# Patient Record
Sex: FEMALE | Race: WHITE | NOT HISPANIC OR LATINO | URBAN - METROPOLITAN AREA
[De-identification: names, ages, dates, MRNs, and addresses within clinical notes are randomized per-mention and may not be internally consistent; named-entity substitution may affect disease eponyms.]

---

## 2022-10-24 ENCOUNTER — LAB (OUTPATIENT)
Dept: LAB | Facility: HOSPITAL | Age: 37
End: 2022-10-24

## 2022-10-24 ENCOUNTER — TRANSCRIBE ORDERS (OUTPATIENT)
Dept: LAB | Facility: HOSPITAL | Age: 37
End: 2022-10-24

## 2022-10-24 DIAGNOSIS — Z01.419 ROUTINE GYNECOLOGICAL EXAMINATION: Primary | ICD-10-CM

## 2022-10-24 DIAGNOSIS — Z01.419 ROUTINE GYNECOLOGICAL EXAMINATION: ICD-10-CM

## 2022-10-24 LAB
ESTRADIOL SERPL HS-MCNC: 44.5 PG/ML
FSH SERPL-ACNC: 4.69 MIU/ML
LH SERPL-ACNC: 4.99 MIU/ML
PROGEST SERPL-MCNC: 0.31 NG/ML
PROLACTIN SERPL-MCNC: 20.6 NG/ML (ref 4.79–23.3)
TSH SERPL DL<=0.05 MIU/L-ACNC: 3.18 UIU/ML (ref 0.27–4.2)

## 2022-10-24 PROCEDURE — 83001 ASSAY OF GONADOTROPIN (FSH): CPT

## 2022-10-24 PROCEDURE — 36415 COLL VENOUS BLD VENIPUNCTURE: CPT

## 2022-10-24 PROCEDURE — 83002 ASSAY OF GONADOTROPIN (LH): CPT

## 2022-10-24 PROCEDURE — 84146 ASSAY OF PROLACTIN: CPT

## 2022-10-24 PROCEDURE — 82670 ASSAY OF TOTAL ESTRADIOL: CPT

## 2022-10-24 PROCEDURE — 84144 ASSAY OF PROGESTERONE: CPT

## 2022-10-24 PROCEDURE — 84443 ASSAY THYROID STIM HORMONE: CPT

## 2023-10-04 ENCOUNTER — TRANSCRIBE ORDERS (OUTPATIENT)
Dept: LAB | Facility: HOSPITAL | Age: 38
End: 2023-10-04
Payer: COMMERCIAL

## 2023-10-04 ENCOUNTER — LAB (OUTPATIENT)
Dept: LAB | Facility: HOSPITAL | Age: 38
End: 2023-10-04
Payer: COMMERCIAL

## 2023-10-04 DIAGNOSIS — Z32.01 PREGNANCY EXAMINATION OR TEST, POSITIVE RESULT: ICD-10-CM

## 2023-10-04 DIAGNOSIS — N92.6 IRREGULAR MENSTRUAL CYCLE: Primary | ICD-10-CM

## 2023-10-04 LAB — HCG INTACT+B SERPL-ACNC: 115 MIU/ML

## 2023-10-04 PROCEDURE — 36415 COLL VENOUS BLD VENIPUNCTURE: CPT | Performed by: OBSTETRICS & GYNECOLOGY

## 2023-10-04 PROCEDURE — 84702 CHORIONIC GONADOTROPIN TEST: CPT | Performed by: OBSTETRICS & GYNECOLOGY

## 2023-10-06 ENCOUNTER — LAB (OUTPATIENT)
Dept: LAB | Facility: HOSPITAL | Age: 38
End: 2023-10-06
Payer: COMMERCIAL

## 2023-10-06 ENCOUNTER — TRANSCRIBE ORDERS (OUTPATIENT)
Dept: LAB | Facility: HOSPITAL | Age: 38
End: 2023-10-06
Payer: COMMERCIAL

## 2023-10-06 DIAGNOSIS — Z32.01 PREGNANCY EXAMINATION OR TEST, POSITIVE RESULT: ICD-10-CM

## 2023-10-06 DIAGNOSIS — N92.6 IRREGULAR MENSTRUAL CYCLE: ICD-10-CM

## 2023-10-06 DIAGNOSIS — N92.6 IRREGULAR MENSTRUAL CYCLE: Primary | ICD-10-CM

## 2023-10-06 LAB — HCG INTACT+B SERPL-ACNC: 427 MIU/ML

## 2023-10-06 PROCEDURE — 84702 CHORIONIC GONADOTROPIN TEST: CPT

## 2023-10-06 PROCEDURE — 36415 COLL VENOUS BLD VENIPUNCTURE: CPT

## 2023-11-29 ENCOUNTER — TRANSCRIBE ORDERS (OUTPATIENT)
Dept: LAB | Facility: HOSPITAL | Age: 38
End: 2023-11-29
Payer: COMMERCIAL

## 2023-11-29 ENCOUNTER — LAB (OUTPATIENT)
Dept: LAB | Facility: HOSPITAL | Age: 38
End: 2023-11-29
Payer: COMMERCIAL

## 2023-11-29 DIAGNOSIS — Z34.01 ENCOUNTER FOR SUPERVISION OF NORMAL FIRST PREGNANCY IN FIRST TRIMESTER: ICD-10-CM

## 2023-11-29 DIAGNOSIS — Z34.01 ENCOUNTER FOR SUPERVISION OF NORMAL FIRST PREGNANCY IN FIRST TRIMESTER: Primary | ICD-10-CM

## 2023-11-29 DIAGNOSIS — Z3A.12 12 WEEKS GESTATION OF PREGNANCY: ICD-10-CM

## 2023-11-29 DIAGNOSIS — Z34.81 PRENATAL CARE, SUBSEQUENT PREGNANCY, FIRST TRIMESTER: ICD-10-CM

## 2023-11-29 LAB
ABO GROUP BLD: NORMAL
AMPHET+METHAMPHET UR QL: NEGATIVE
AMPHETAMINES UR QL: NEGATIVE
BACTERIA UR QL AUTO: ABNORMAL /HPF
BARBITURATES UR QL SCN: NEGATIVE
BENZODIAZ UR QL SCN: NEGATIVE
BILIRUB UR QL STRIP: NEGATIVE
BLD GP AB SCN SERPL QL: NEGATIVE
BUPRENORPHINE SERPL-MCNC: NEGATIVE NG/ML
CANNABINOIDS SERPL QL: NEGATIVE
CLARITY UR: ABNORMAL
COCAINE UR QL: NEGATIVE
COLOR UR: YELLOW
DEPRECATED RDW RBC AUTO: 43.5 FL (ref 37–54)
ERYTHROCYTE [DISTWIDTH] IN BLOOD BY AUTOMATED COUNT: 13.7 % (ref 12.3–15.4)
FENTANYL UR-MCNC: NEGATIVE NG/ML
GLUCOSE UR STRIP-MCNC: NEGATIVE MG/DL
HBA1C MFR BLD: 5.4 % (ref 4.8–5.6)
HBV SURFACE AG SERPL QL IA: NORMAL
HCT VFR BLD AUTO: 37.6 % (ref 34–46.6)
HCV AB SER DONR QL: NORMAL
HGB BLD-MCNC: 12.5 G/DL (ref 12–15.9)
HGB UR QL STRIP.AUTO: NEGATIVE
HIV 1+2 AB+HIV1 P24 AG SERPL QL IA: NORMAL
HOLD SPECIMEN: NORMAL
HYALINE CASTS UR QL AUTO: ABNORMAL /LPF
KETONES UR QL STRIP: NEGATIVE
LEUKOCYTE ESTERASE UR QL STRIP.AUTO: NEGATIVE
MCH RBC QN AUTO: 29 PG (ref 26.6–33)
MCHC RBC AUTO-ENTMCNC: 33.2 G/DL (ref 31.5–35.7)
MCV RBC AUTO: 87.2 FL (ref 79–97)
METHADONE UR QL SCN: NEGATIVE
NITRITE UR QL STRIP: NEGATIVE
OPIATES UR QL: NEGATIVE
OXYCODONE UR QL SCN: NEGATIVE
PCP UR QL SCN: NEGATIVE
PH UR STRIP.AUTO: 7 [PH] (ref 5–8)
PLATELET # BLD AUTO: 280 10*3/MM3 (ref 140–450)
PMV BLD AUTO: 10.7 FL (ref 6–12)
PROT UR QL STRIP: ABNORMAL
RBC # BLD AUTO: 4.31 10*6/MM3 (ref 3.77–5.28)
RBC # UR STRIP: ABNORMAL /HPF
REF LAB TEST METHOD: ABNORMAL
REF LAB TEST RESULTS: NORMAL
RH BLD: POSITIVE
SP GR UR STRIP: 1.02 (ref 1–1.03)
SQUAMOUS #/AREA URNS HPF: ABNORMAL /HPF
TRICYCLICS UR QL SCN: NEGATIVE
UROBILINOGEN UR QL STRIP: ABNORMAL
WBC # UR STRIP: ABNORMAL /HPF
WBC NRBC COR # BLD AUTO: 7.59 10*3/MM3 (ref 3.4–10.8)

## 2023-11-29 PROCEDURE — 36415 COLL VENOUS BLD VENIPUNCTURE: CPT

## 2023-11-29 PROCEDURE — 87491 CHLMYD TRACH DNA AMP PROBE: CPT

## 2023-11-29 PROCEDURE — 86901 BLOOD TYPING SEROLOGIC RH(D): CPT

## 2023-11-29 PROCEDURE — 80307 DRUG TEST PRSMV CHEM ANLYZR: CPT

## 2023-11-29 PROCEDURE — 86780 TREPONEMA PALLIDUM: CPT

## 2023-11-29 PROCEDURE — 86900 BLOOD TYPING SEROLOGIC ABO: CPT

## 2023-11-29 PROCEDURE — 86787 VARICELLA-ZOSTER ANTIBODY: CPT

## 2023-11-29 PROCEDURE — 85027 COMPLETE CBC AUTOMATED: CPT

## 2023-11-29 PROCEDURE — 81001 URINALYSIS AUTO W/SCOPE: CPT

## 2023-11-29 PROCEDURE — 87591 N.GONORRHOEAE DNA AMP PROB: CPT

## 2023-11-29 PROCEDURE — 86762 RUBELLA ANTIBODY: CPT

## 2023-11-29 PROCEDURE — G0432 EIA HIV-1/HIV-2 SCREEN: HCPCS

## 2023-11-29 PROCEDURE — 86803 HEPATITIS C AB TEST: CPT

## 2023-11-29 PROCEDURE — 87086 URINE CULTURE/COLONY COUNT: CPT

## 2023-11-29 PROCEDURE — 87340 HEPATITIS B SURFACE AG IA: CPT

## 2023-11-29 PROCEDURE — 86850 RBC ANTIBODY SCREEN: CPT

## 2023-11-29 PROCEDURE — 83036 HEMOGLOBIN GLYCOSYLATED A1C: CPT

## 2023-11-30 LAB
BACTERIA SPEC AEROBE CULT: NO GROWTH
RUBV IGG SERPL IA-ACNC: 1.37 INDEX
VZV IGG SER IA-ACNC: 536 INDEX

## 2023-12-01 LAB — TREPONEMA PALLIDUM IGG+IGM AB [PRESENCE] IN SERUM OR PLASMA BY IMMUNOASSAY: NON REACTIVE

## 2023-12-02 LAB
C TRACH RRNA SPEC QL NAA+PROBE: NEGATIVE
N GONORRHOEA RRNA SPEC QL NAA+PROBE: NEGATIVE

## 2024-02-15 ENCOUNTER — TRANSCRIBE ORDERS (OUTPATIENT)
Dept: OBSTETRICS AND GYNECOLOGY | Facility: HOSPITAL | Age: 39
End: 2024-02-15
Payer: COMMERCIAL

## 2024-02-15 DIAGNOSIS — O09.519 ADVANCED MATERNAL AGE, PRIMIGRAVIDA, ANTEPARTUM: ICD-10-CM

## 2024-02-15 DIAGNOSIS — Z36.3 ANTENATAL SCREENING FOR MALFORMATION USING ULTRASONICS: Primary | ICD-10-CM

## 2024-02-15 DIAGNOSIS — O99.210 OBESITY IN PREGNANCY, ANTEPARTUM: ICD-10-CM

## 2024-03-04 ENCOUNTER — OFFICE VISIT (OUTPATIENT)
Dept: OBSTETRICS AND GYNECOLOGY | Facility: HOSPITAL | Age: 39
End: 2024-03-04
Payer: COMMERCIAL

## 2024-03-04 ENCOUNTER — HOSPITAL ENCOUNTER (OUTPATIENT)
Dept: WOMENS IMAGING | Facility: HOSPITAL | Age: 39
Discharge: HOME | End: 2024-03-04
Admitting: OBSTETRICS & GYNECOLOGY
Payer: COMMERCIAL

## 2024-03-04 VITALS — WEIGHT: 240 LBS | SYSTOLIC BLOOD PRESSURE: 115 MMHG | DIASTOLIC BLOOD PRESSURE: 77 MMHG

## 2024-03-04 DIAGNOSIS — Z36.3 ANTENATAL SCREENING FOR MALFORMATION USING ULTRASONICS: ICD-10-CM

## 2024-03-04 DIAGNOSIS — O09.519 ADVANCED MATERNAL AGE, PRIMIGRAVIDA, ANTEPARTUM: Primary | ICD-10-CM

## 2024-03-04 DIAGNOSIS — Z3A.25 25 WEEKS GESTATION OF PREGNANCY: ICD-10-CM

## 2024-03-04 DIAGNOSIS — O09.519 ADVANCED MATERNAL AGE, PRIMIGRAVIDA, ANTEPARTUM: ICD-10-CM

## 2024-03-04 DIAGNOSIS — O99.210 OBESITY IN PREGNANCY, ANTEPARTUM: ICD-10-CM

## 2024-03-04 PROCEDURE — 76811 OB US DETAILED SNGL FETUS: CPT

## 2024-03-04 PROCEDURE — 76811 OB US DETAILED SNGL FETUS: CPT | Performed by: OBSTETRICS & GYNECOLOGY

## 2024-03-04 RX ORDER — AMOXICILLIN 500 MG
CAPSULE ORAL
COMMUNITY

## 2024-03-04 NOTE — PROGRESS NOTES
Pt has an appt with Dr. Brunson on 3/15/24. NIPT low risk. Angelman syndrome risk unchanged. Pt denies vaginal bleeding or abdominal pain.

## 2024-03-04 NOTE — PROGRESS NOTES
Patient seen in Maternal Fetal Medicine clinic today. Please see full note in under imaging tab of patient chart in Epic (Viewpoint report).    Aysha Nails MD

## 2024-03-15 ENCOUNTER — LAB (OUTPATIENT)
Dept: LAB | Facility: HOSPITAL | Age: 39
End: 2024-03-15
Payer: COMMERCIAL

## 2024-03-15 ENCOUNTER — TRANSCRIBE ORDERS (OUTPATIENT)
Dept: LAB | Facility: HOSPITAL | Age: 39
End: 2024-03-15
Payer: COMMERCIAL

## 2024-03-15 DIAGNOSIS — Z3A.23 23 WEEKS GESTATION OF PREGNANCY: Primary | ICD-10-CM

## 2024-03-15 DIAGNOSIS — Z3A.23 23 WEEKS GESTATION OF PREGNANCY: ICD-10-CM

## 2024-03-15 LAB
BLD GP AB SCN SERPL QL: NEGATIVE
DEPRECATED RDW RBC AUTO: 41.6 FL (ref 37–54)
ERYTHROCYTE [DISTWIDTH] IN BLOOD BY AUTOMATED COUNT: 13.5 % (ref 12.3–15.4)
GLUCOSE 1H P 100 G GLC PO SERPL-MCNC: 121 MG/DL (ref 65–139)
HCT VFR BLD AUTO: 32.5 % (ref 34–46.6)
HGB BLD-MCNC: 10.7 G/DL (ref 12–15.9)
MCH RBC QN AUTO: 28.6 PG (ref 26.6–33)
MCHC RBC AUTO-ENTMCNC: 32.9 G/DL (ref 31.5–35.7)
MCV RBC AUTO: 86.9 FL (ref 79–97)
PLATELET # BLD AUTO: 228 10*3/MM3 (ref 140–450)
PMV BLD AUTO: 11.2 FL (ref 6–12)
RBC # BLD AUTO: 3.74 10*6/MM3 (ref 3.77–5.28)
WBC NRBC COR # BLD AUTO: 8.87 10*3/MM3 (ref 3.4–10.8)

## 2024-03-15 PROCEDURE — 82950 GLUCOSE TEST: CPT

## 2024-03-15 PROCEDURE — 36415 COLL VENOUS BLD VENIPUNCTURE: CPT

## 2024-03-15 PROCEDURE — 82948 REAGENT STRIP/BLOOD GLUCOSE: CPT | Performed by: OBSTETRICS & GYNECOLOGY

## 2024-03-15 PROCEDURE — 86780 TREPONEMA PALLIDUM: CPT

## 2024-03-15 PROCEDURE — 86850 RBC ANTIBODY SCREEN: CPT

## 2024-03-15 PROCEDURE — 85027 COMPLETE CBC AUTOMATED: CPT

## 2024-03-17 LAB — TREPONEMA PALLIDUM IGG+IGM AB [PRESENCE] IN SERUM OR PLASMA BY IMMUNOASSAY: NON REACTIVE

## 2024-03-22 ENCOUNTER — OFFICE VISIT (OUTPATIENT)
Dept: ORTHOPEDIC SURGERY | Facility: CLINIC | Age: 39
End: 2024-03-22
Payer: COMMERCIAL

## 2024-03-22 VITALS
BODY MASS INDEX: 39.65 KG/M2 | DIASTOLIC BLOOD PRESSURE: 88 MMHG | WEIGHT: 238 LBS | HEIGHT: 65 IN | SYSTOLIC BLOOD PRESSURE: 138 MMHG

## 2024-03-22 DIAGNOSIS — G56.03 CARPAL TUNNEL SYNDROME, BILATERAL: Primary | ICD-10-CM

## 2024-03-22 RX ORDER — PNV NO.95/FERROUS FUM/FOLIC AC 28MG-0.8MG
TABLET ORAL
COMMUNITY

## 2024-03-22 NOTE — PROGRESS NOTES
[facility]     Office Visit       Date: 03/22/2024   Patient Name: Dena Li  MRN: [id number]  YOB: 1985    Referring Physician: Tess Brunson MD     Chief Complaint:   Chief Complaint   Patient presents with    Right Hand - Pain    Left Hand - Pain       History of Present Illness:   Dena Li is a 38 y.o. female right-hand-dominant presents with bilateral right greater than left numbness and tingling in her thumb index and middle finger of approximately 8 weeks duration.  Patient is 28 weeks pregnant.  She reports she noticed increasing right hand numbness and tingling in her thumb index and middle finger 8 weeks ago.  Reports that it is progressed from intermittent symptoms to constant numbness in her thumb index and middle finger.  She reports her left hand symptoms are intermittent and worse at night.  She denies weakness.  She is otherwise healthy.  She has no complications from her pregnancy thus far.  No gestational diabetes.  Works as a .  She denies smoking.      Subjective   Review of Systems:   Review of Systems   Constitutional:  Negative for chills, fever, unexpected weight gain and unexpected weight loss.   HENT:  Negative for congestion, postnasal drip and rhinorrhea.    Eyes:  Negative for blurred vision.   Respiratory:  Negative for shortness of breath.    Cardiovascular:  Negative for leg swelling.   Gastrointestinal:  Negative for abdominal pain, nausea and vomiting.   Genitourinary:  Negative for difficulty urinating.   Musculoskeletal:  Positive for arthralgias. Negative for gait problem, joint swelling and myalgias.   Skin:  Negative for skin lesions and wound.   Neurological:  Negative for dizziness, weakness, light-headedness and numbness.   Hematological:  Does not bruise/bleed easily.   Psychiatric/Behavioral:  Negative for depressed mood.         Pertinent  "review of systems per HPI.     I reviewed the patient's chief complaint, history of present illness, review of systems, past medical history, surgical history, family history, social history, medications and allergy list in the EMR on 03/22/2024 and agree with the findings above.    Objective    Vital Signs:   Vitals:    03/22/24 0809   BP: 138/88   Weight: 108 kg (238 lb)  Comment: 28.5 weeks pregnant   Height: 164 cm (64.57\")     BMI: Class 3 Severe Obesity (BMI >=40). Obesity-related health conditions include the following: none. Obesity is unchanged. BMI is is above average; no BMI management plan is appropriate. We discussed portion control and increasing exercise.       General Appearance: No acute distress. Alert and oriented.     Chest:  Non-labored breathing on room air. Regular rate and rhythm.    Right Upper Extremity Exam:    No palpable masses or visible lesions  Fingers are warm, well-perfused with appropriate capillary refill.  Palpable radial pulse.    Sensation intact to light touch in median, radial and ulnar nerve distributions.    Motor- Fires FPL, ulnar intrinsics, EPL/EDC w/ full active and passive range of motion. Strength intact.    Non-tender except for in the areas highlighted below    Sensation diminished to light touch in median nerve distribution  Tinel's Sign-- positive  Phalen's Test--positive   Carpal Compression Test--positive  Thenar wasting--negative  Elbow Flexion test--negative  Cubital tunnel signs--negative    Left Upper Extremity Exam:    No palpable masses or visible lesions  Fingers are warm, well-perfused with appropriate capillary refill.  Palpable radial pulse.    Sensation intact to light touch in median, radial and ulnar nerve distributions.    Motor- Fires FPL, ulnar intrinsics, EPL/EDC w/ full active and passive range of motion. Strength intact.    Non-tender except for in the areas highlighted below    Sensation intact to light touch in median nerve " distribution  Tinel's Sign-- positive  Phalen's Test--positive   Carpal Compression Test--positive  Thenar wasting--negative  Elbow Flexion test--negative  Cubital tunnel signs--negative    CTS-6 Questionnaire        R/L  Symptoms and History  Numbness in the median nerve territory  Y/y (3.5)   Nocturnal numbness    Y/y (4)   Physical Examination  Thenar atrophy and/or weakness   N/n (5)    Positive Phalen's test    Y/y (5)   Loss of 2-point Discrimination >5 mm  Y/n (4.5)  Positive Tinel sign     Y/y (4)                Total    21/16.5 (26)          Imaging/Studies:   Imaging Results (Last 24 Hours)       ** No results found for the last 24 hours. **            None    Procedures:  Procedures    Quality Measures:   ACP:   ACP discussion was declined by the patient, Patient does not have an advance directive, declines further assistance.    Tobacco:   Dena Li  reports that she has never smoked. She has never used smokeless tobacco.      Assessment / Plan    Assessment/Plan:     There are no diagnoses linked to this encounter.     Dena Farrar a 38 y.o. female who presents with:      ICD-10-CM ICD-9-CM   1. Carpal tunnel syndrome, bilateral  G56.03 354.0         Presents with bilateral carpal tunnel syndrome secondary to pregnancy.  I discussed the pathophysiology of carpal tunnel syndrome with the patient particularly in pregnancy.  Carpal tunnel is a clinical diagnosis and 80 to 90% of carpal tunnel syndromes associated with pregnancy resolved within 1 month of delivery to ideally we would pursue conservative management until after delivery however this patient does have evidence of severe right carpal tunnel syndrome with constant numbness and tingling.  I would like to get bilateral EMG nerve conduction studies within the next week to assess the severity of her carpal tunnel syndrome.  If she does not have evidence of significant median nerve axonal loss on neurodiagnostics we can try  bilateral wrist corticosteroid injections.  If she does have evidence of axonal loss we may consider carpal tunnel release under local anesthesia.  Recommend bilateral wrist splints in the meantime.  Follow-up with me after neurodiagnostics to discuss treatment options.    Follow Up:   Return for Follow-up after EMG/NCS.        Percy Lopez MD  [facility] [place] [facility] and Upper Extremity Surgeon

## 2024-03-28 ENCOUNTER — CLINICAL SUPPORT (OUTPATIENT)
Age: 39
End: 2024-03-28
Payer: COMMERCIAL

## 2024-03-28 VITALS
SYSTOLIC BLOOD PRESSURE: 140 MMHG | HEIGHT: 65 IN | WEIGHT: 238.1 LBS | BODY MASS INDEX: 39.67 KG/M2 | DIASTOLIC BLOOD PRESSURE: 100 MMHG

## 2024-03-28 DIAGNOSIS — G56.03 CARPAL TUNNEL SYNDROME, BILATERAL: Primary | ICD-10-CM

## 2024-03-28 RX ORDER — AZITHROMYCIN 250 MG/1
TABLET, FILM COATED ORAL
COMMUNITY
Start: 2024-03-26

## 2024-03-28 RX ORDER — LIDOCAINE HYDROCHLORIDE 10 MG/ML
0.5 INJECTION, SOLUTION EPIDURAL; INFILTRATION; INTRACAUDAL; PERINEURAL
Status: COMPLETED | OUTPATIENT
Start: 2024-03-28 | End: 2024-03-28

## 2024-03-28 RX ORDER — TRIAMCINOLONE ACETONIDE 40 MG/ML
20 INJECTION, SUSPENSION INTRA-ARTICULAR; INTRAMUSCULAR
Status: COMPLETED | OUTPATIENT
Start: 2024-03-28 | End: 2024-03-28

## 2024-03-28 RX ADMIN — LIDOCAINE HYDROCHLORIDE 0.5 ML: 10 INJECTION, SOLUTION EPIDURAL; INFILTRATION; INTRACAUDAL; PERINEURAL at 10:50

## 2024-03-28 RX ADMIN — TRIAMCINOLONE ACETONIDE 20 MG: 40 INJECTION, SUSPENSION INTRA-ARTICULAR; INTRAMUSCULAR at 10:50

## 2024-03-28 NOTE — PROGRESS NOTES
[facility] Orth[facility]     Follow-up Office Visit       Date: 03/28/2024   Patient Name: Dena Li  MRN: [id number]  YOB: 1985    Chief Complaint:   Chief Complaint   Patient presents with    Follow-up     1 week recheck - Carpal tunnel syndrome, bilateral           History of Present Illness:   Dena Li is a 38 y.o. female presents for evaluation of bilateral carpal tunnel syndrome.  Patient is 29 weeks pregnant.  At her last visit we discussed corticosteroid injection versus EMG nerve conduction studies given the severity of her carpal tunnel.  She initially agreed to an EMG nerve conduction studies however she has since changed her mind and would like to try bilateral corticosteroid injections today.  She reports her symptoms are unchanged since her last visit.  No new concerns.    Subjective   Review of Systems:   Review of Systems   Constitutional:  Negative for chills, fever, unexpected weight gain and unexpected weight loss.   HENT:  Negative for congestion, postnasal drip and rhinorrhea.    Eyes:  Negative for blurred vision.   Respiratory:  Negative for shortness of breath.    Cardiovascular:  Negative for leg swelling.   Gastrointestinal:  Negative for abdominal pain, nausea and vomiting.   Genitourinary:  Negative for difficulty urinating.   Musculoskeletal:  Positive for arthralgias. Negative for gait problem, joint swelling and myalgias.   Skin:  Negative for skin lesions and wound.   Neurological:  Negative for dizziness, weakness, light-headedness and numbness.   Hematological:  Does not bruise/bleed easily.   Psychiatric/Behavioral:  Negative for depressed mood.    All other systems reviewed and are negative.       Pertinent review of systems per HPI    I reviewed the patient's chief complaint, history of present illness, review of systems, past medical history, surgical history, family history,  "social history, medications and allergy list in the EMR on 03/28/2024 and agree with the findings above.    Objective    Vital Signs:   Vitals:    03/28/24 1038   BP: 140/100   Weight: 108 kg (238 lb 1.6 oz)   Height: 164 cm (64.57\")     BMI: Class 3 Severe Obesity (BMI >=40). Obesity-related health conditions include the following: none. Obesity is unchanged. BMI is is above average; no BMI management plan is appropriate. We discussed portion control and increasing exercise.       General Appearance: No acute distress. Alert and oriented.     Chest:  Non-labored breathing on room air    Right Upper Extremity Exam:     No palpable masses or visible lesions  Fingers are warm, well-perfused with appropriate capillary refill.  Palpable radial pulse.     Sensation intact to light touch in median, radial and ulnar nerve distributions.     Motor- Fires FPL, ulnar intrinsics, EPL/EDC w/ full active and passive range of motion. Strength intact.     Non-tender except for in the areas highlighted below     Sensation diminished to light touch in median nerve distribution  Tinel's Sign-- positive  Phalen's Test--positive             Carpal Compression Test--positive  Thenar wasting--negative  Elbow Flexion test--negative  Cubital tunnel signs--negative     Left Upper Extremity Exam:     No palpable masses or visible lesions  Fingers are warm, well-perfused with appropriate capillary refill.  Palpable radial pulse.     Sensation intact to light touch in median, radial and ulnar nerve distributions.     Motor- Fires FPL, ulnar intrinsics, EPL/EDC w/ full active and passive range of motion. Strength intact.     Non-tender except for in the areas highlighted below     Sensation intact to light touch in median nerve distribution  Tinel's Sign-- positive  Phalen's Test--positive             Carpal Compression Test--positive  Thenar wasting--negative  Elbow Flexion test--negative  Cubital tunnel signs--negative     CTS-6 " Questionnaire                                                                          R/L  Symptoms and History  Numbness in the median nerve territory                    Y/y (3.5)   Nocturnal numbness                                       Y/y (4)   Physical Examination  Thenar atrophy and/or weakness                               N/n (5)    Positive Phalen's test                                      Y/y (5)   Loss of 2-point Discrimination >5 mm                        Y/n (4.5)  Positive Tinel sign                                                       Y/y (4)                Total    21/16.5 (26)    Imaging/Studies:   Imaging Results (Last 24 Hours)       ** No results found for the last 24 hours. **            No new imaging    Procedures:  Procedures    Quality Measures:   Quality Measures:   ACP:   ACP discussion was declined by the patient, Patient does not have an advance directive, declines further assistance.    Tobacco:   Dena Li  reports that she has never smoked. She has never used smokeless tobacco.    Assessment / Plan    Assessment/Plan:      Diagnosis Plan   1. Carpal tunnel syndrome, bilateral            Patient presents with bilateral carpal tunnel syndrome.  I again discussed the pathophysiology of carpal tunnel syndrome particularly with its relation to pregnancy.  80 to 90% of carpal tunnel syndromes associated with pregnancy resolves within 1 month delivery so the goal of treatment is to get her through her pregnancy.  She does have evidence of clinically severe carpal tunnel syndrome however she would like to forego EMG and nerve conduction studies at this time.  She is here today for bilateral carpal tunnel injections.  Recommend patient follow-up as needed if symptoms do not resolve with injection and later delivery.    Procedure Note- Carpal Tunnel Injection:    I discussed with the patient the potential benefits of performing therapeutic injections as well as potential risks  including but not limited to infection, swelling, pain, bleeding, bruising, nerve/vessel damage, skin color changes, transient elevation in blood glucose levels, and fat atrophy. After informed consent and after the areas were prepped with chlorhexadine soap, ethyl chloride was used to numb the skin. The palmaris as a landmark, 20mg of Kenalog and 0.5 cc of 1% lidocaine was injected into the bilateral carpal tunnel, taking care to avoid injecting directly into the median nerve.  The patient tolerated the procedure well. There were no complications. A sterile dressing was placed over the injection sites.    Follow Up:   Return if symptoms worsen or fail to improve.        Percy Lopez MD  [facility] [place] [facility] Upper Extremity Surgeon

## 2024-03-28 NOTE — PROGRESS NOTES
Procedure   - Hand/Upper Extremity Injection: bilateral carpal tunnel for carpal tunnel syndrome on 3/28/2024 10:50 AM  Indications: pain  Details: 27 G needle, volar approach  Medications (Right): 0.5 mL lidocaine PF 1% 1 %; 20 mg triamcinolone acetonide 40 MG/ML  Medications (Left): 0.5 mL lidocaine PF 1% 1 %; 20 mg triamcinolone acetonide 40 MG/ML  Outcome: tolerated well, no immediate complications  Procedure, treatment alternatives, risks and benefits explained, specific risks discussed. Consent was given by the patient. Immediately prior to procedure a time out was called to verify the correct patient, procedure, equipment, support staff and site/side marked as required. Patient was prepped and draped in the usual sterile fashion.

## 2024-04-22 ENCOUNTER — HOSPITAL ENCOUNTER (OUTPATIENT)
Dept: WOMENS IMAGING | Facility: HOSPITAL | Age: 39
Discharge: HOME | End: 2024-04-22
Admitting: OBSTETRICS & GYNECOLOGY
Payer: COMMERCIAL

## 2024-04-22 ENCOUNTER — OFFICE VISIT (OUTPATIENT)
Dept: OBSTETRICS AND GYNECOLOGY | Facility: HOSPITAL | Age: 39
End: 2024-04-22
Payer: COMMERCIAL

## 2024-04-22 VITALS — WEIGHT: 247 LBS | DIASTOLIC BLOOD PRESSURE: 79 MMHG | SYSTOLIC BLOOD PRESSURE: 123 MMHG | BODY MASS INDEX: 41.66 KG/M2

## 2024-04-22 DIAGNOSIS — E66.01 MORBID OBESITY WITH BMI OF 40.0-44.9, ADULT: ICD-10-CM

## 2024-04-22 DIAGNOSIS — Z3A.32 32 WEEKS GESTATION OF PREGNANCY: ICD-10-CM

## 2024-04-22 DIAGNOSIS — O09.519 ADVANCED MATERNAL AGE, PRIMIGRAVIDA, ANTEPARTUM: ICD-10-CM

## 2024-04-22 DIAGNOSIS — O09.519 ADVANCED MATERNAL AGE, PRIMIGRAVIDA, ANTEPARTUM: Primary | ICD-10-CM

## 2024-04-22 PROCEDURE — 76819 FETAL BIOPHYS PROFIL W/O NST: CPT | Performed by: OBSTETRICS & GYNECOLOGY

## 2024-04-22 PROCEDURE — 76816 OB US FOLLOW-UP PER FETUS: CPT

## 2024-04-22 PROCEDURE — 76816 OB US FOLLOW-UP PER FETUS: CPT | Performed by: OBSTETRICS & GYNECOLOGY

## 2024-04-22 PROCEDURE — 76820 UMBILICAL ARTERY ECHO: CPT | Performed by: OBSTETRICS & GYNECOLOGY

## 2024-04-22 PROCEDURE — 76819 FETAL BIOPHYS PROFIL W/O NST: CPT

## 2024-04-22 NOTE — PROGRESS NOTES
Patient denies bleeding, Leaking fluid or contractions  NITPT low risk  Patients next follow up with Dr. Brunson office is 4/25/2024

## 2024-05-19 LAB — EXTERNAL GROUP B STREP ANTIGEN: NEGATIVE

## 2024-05-30 ENCOUNTER — HOSPITAL ENCOUNTER (INPATIENT)
Facility: HOSPITAL | Age: 39
LOS: 3 days | Discharge: HOME | End: 2024-06-02
Attending: OBSTETRICS & GYNECOLOGY | Admitting: OBSTETRICS & GYNECOLOGY
Payer: COMMERCIAL

## 2024-05-30 PROBLEM — O13.9 GESTATIONAL HYPERTENSION: Status: ACTIVE | Noted: 2024-05-30

## 2024-05-30 PROBLEM — Z34.90 TERM PREGNANCY: Status: ACTIVE | Noted: 2024-05-30

## 2024-05-30 LAB
ABO GROUP BLD: NORMAL
ALP SERPL-CCNC: 105 U/L (ref 39–117)
ALT SERPL W P-5'-P-CCNC: 16 U/L (ref 1–33)
AST SERPL-CCNC: 16 U/L (ref 1–32)
BILIRUB SERPL-MCNC: 0.3 MG/DL (ref 0–1.2)
BLD GP AB SCN SERPL QL: NEGATIVE
CREAT SERPL-MCNC: 0.43 MG/DL (ref 0.57–1)
DEPRECATED RDW RBC AUTO: 50.8 FL (ref 37–54)
ERYTHROCYTE [DISTWIDTH] IN BLOOD BY AUTOMATED COUNT: 15.5 % (ref 12.3–15.4)
HCT VFR BLD AUTO: 36.6 % (ref 34–46.6)
HGB BLD-MCNC: 11.8 G/DL (ref 12–15.9)
LDH SERPL-CCNC: 182 U/L (ref 135–214)
MCH RBC QN AUTO: 29 PG (ref 26.6–33)
MCHC RBC AUTO-ENTMCNC: 32.2 G/DL (ref 31.5–35.7)
MCV RBC AUTO: 89.9 FL (ref 79–97)
PLATELET # BLD AUTO: 209 10*3/MM3 (ref 140–450)
PMV BLD AUTO: 10.8 FL (ref 6–12)
RBC # BLD AUTO: 4.07 10*6/MM3 (ref 3.77–5.28)
RH BLD: POSITIVE
T PALLIDUM IGG SER QL: NORMAL
T&S EXPIRATION DATE: NORMAL
URATE SERPL-MCNC: 3.9 MG/DL (ref 2.4–5.7)
WBC NRBC COR # BLD AUTO: 10.02 10*3/MM3 (ref 3.4–10.8)

## 2024-05-30 PROCEDURE — 86901 BLOOD TYPING SEROLOGIC RH(D): CPT | Performed by: OBSTETRICS & GYNECOLOGY

## 2024-05-30 PROCEDURE — 86780 TREPONEMA PALLIDUM: CPT | Performed by: OBSTETRICS & GYNECOLOGY

## 2024-05-30 PROCEDURE — 84075 ASSAY ALKALINE PHOSPHATASE: CPT | Performed by: OBSTETRICS & GYNECOLOGY

## 2024-05-30 PROCEDURE — 86850 RBC ANTIBODY SCREEN: CPT | Performed by: OBSTETRICS & GYNECOLOGY

## 2024-05-30 PROCEDURE — 82247 BILIRUBIN TOTAL: CPT | Performed by: OBSTETRICS & GYNECOLOGY

## 2024-05-30 PROCEDURE — 84450 TRANSFERASE (AST) (SGOT): CPT | Performed by: OBSTETRICS & GYNECOLOGY

## 2024-05-30 PROCEDURE — 85027 COMPLETE CBC AUTOMATED: CPT | Performed by: OBSTETRICS & GYNECOLOGY

## 2024-05-30 PROCEDURE — 59025 FETAL NON-STRESS TEST: CPT

## 2024-05-30 PROCEDURE — 84550 ASSAY OF BLOOD/URIC ACID: CPT | Performed by: OBSTETRICS & GYNECOLOGY

## 2024-05-30 PROCEDURE — 25810000003 LACTATED RINGERS PER 1000 ML: Performed by: OBSTETRICS & GYNECOLOGY

## 2024-05-30 PROCEDURE — 86900 BLOOD TYPING SEROLOGIC ABO: CPT | Performed by: OBSTETRICS & GYNECOLOGY

## 2024-05-30 PROCEDURE — 83615 LACTATE (LD) (LDH) ENZYME: CPT | Performed by: OBSTETRICS & GYNECOLOGY

## 2024-05-30 PROCEDURE — 59200 INSERT CERVICAL DILATOR: CPT | Performed by: OBSTETRICS & GYNECOLOGY

## 2024-05-30 PROCEDURE — 84460 ALANINE AMINO (ALT) (SGPT): CPT | Performed by: OBSTETRICS & GYNECOLOGY

## 2024-05-30 PROCEDURE — 82565 ASSAY OF CREATININE: CPT | Performed by: OBSTETRICS & GYNECOLOGY

## 2024-05-30 RX ORDER — LIDOCAINE HYDROCHLORIDE 10 MG/ML
0.5 INJECTION, SOLUTION EPIDURAL; INFILTRATION; INTRACAUDAL; PERINEURAL ONCE AS NEEDED
Status: DISCONTINUED | OUTPATIENT
Start: 2024-05-30 | End: 2024-06-01 | Stop reason: HOSPADM

## 2024-05-30 RX ORDER — MORPHINE SULFATE 2 MG/ML
1 INJECTION, SOLUTION INTRAMUSCULAR; INTRAVENOUS EVERY 4 HOURS PRN
Status: DISCONTINUED | OUTPATIENT
Start: 2024-05-30 | End: 2024-06-01 | Stop reason: HOSPADM

## 2024-05-30 RX ORDER — ONDANSETRON 2 MG/ML
4 INJECTION INTRAMUSCULAR; INTRAVENOUS EVERY 6 HOURS PRN
Status: DISCONTINUED | OUTPATIENT
Start: 2024-05-30 | End: 2024-06-01 | Stop reason: HOSPADM

## 2024-05-30 RX ORDER — SODIUM CHLORIDE 0.9 % (FLUSH) 0.9 %
10 SYRINGE (ML) INJECTION AS NEEDED
Status: DISCONTINUED | OUTPATIENT
Start: 2024-05-30 | End: 2024-06-01 | Stop reason: HOSPADM

## 2024-05-30 RX ORDER — ACETAMINOPHEN 325 MG/1
650 TABLET ORAL EVERY 4 HOURS PRN
Status: DISCONTINUED | OUTPATIENT
Start: 2024-05-30 | End: 2024-06-01 | Stop reason: HOSPADM

## 2024-05-30 RX ORDER — SODIUM CHLORIDE, SODIUM LACTATE, POTASSIUM CHLORIDE, CALCIUM CHLORIDE 600; 310; 30; 20 MG/100ML; MG/100ML; MG/100ML; MG/100ML
125 INJECTION, SOLUTION INTRAVENOUS CONTINUOUS
Status: DISCONTINUED | OUTPATIENT
Start: 2024-05-30 | End: 2024-06-01

## 2024-05-30 RX ORDER — ONDANSETRON 4 MG/1
4 TABLET, ORALLY DISINTEGRATING ORAL EVERY 6 HOURS PRN
Status: DISCONTINUED | OUTPATIENT
Start: 2024-05-30 | End: 2024-06-01 | Stop reason: HOSPADM

## 2024-05-30 RX ORDER — MAGNESIUM CARB/ALUMINUM HYDROX 105-160MG
30 TABLET,CHEWABLE ORAL ONCE
Status: DISCONTINUED | OUTPATIENT
Start: 2024-05-30 | End: 2024-06-01 | Stop reason: HOSPADM

## 2024-05-30 RX ORDER — NALOXONE HCL 0.4 MG/ML
0.4 VIAL (ML) INJECTION
Status: DISCONTINUED | OUTPATIENT
Start: 2024-05-30 | End: 2024-06-01 | Stop reason: HOSPADM

## 2024-05-30 RX ORDER — OXYTOCIN/0.9 % SODIUM CHLORIDE 30/500 ML
2-20 PLASTIC BAG, INJECTION (ML) INTRAVENOUS
Status: DISCONTINUED | OUTPATIENT
Start: 2024-05-30 | End: 2024-06-01

## 2024-05-30 RX ORDER — OXYTOCIN/0.9 % SODIUM CHLORIDE 30/500 ML
2-20 PLASTIC BAG, INJECTION (ML) INTRAVENOUS
Status: DISCONTINUED | OUTPATIENT
Start: 2024-05-30 | End: 2024-05-30

## 2024-05-30 RX ORDER — TERBUTALINE SULFATE 1 MG/ML
0.25 INJECTION, SOLUTION SUBCUTANEOUS AS NEEDED
Status: DISCONTINUED | OUTPATIENT
Start: 2024-05-30 | End: 2024-06-01 | Stop reason: HOSPADM

## 2024-05-30 RX ORDER — SODIUM CHLORIDE 9 MG/ML
40 INJECTION, SOLUTION INTRAVENOUS AS NEEDED
Status: DISCONTINUED | OUTPATIENT
Start: 2024-05-30 | End: 2024-06-01 | Stop reason: HOSPADM

## 2024-05-30 RX ORDER — SODIUM CHLORIDE 0.9 % (FLUSH) 0.9 %
10 SYRINGE (ML) INJECTION EVERY 12 HOURS SCHEDULED
Status: DISCONTINUED | OUTPATIENT
Start: 2024-05-30 | End: 2024-06-01 | Stop reason: HOSPADM

## 2024-05-30 RX ADMIN — SODIUM CHLORIDE, POTASSIUM CHLORIDE, SODIUM LACTATE AND CALCIUM CHLORIDE 125 ML/HR: 600; 310; 30; 20 INJECTION, SOLUTION INTRAVENOUS at 18:12

## 2024-05-30 NOTE — H&P
[facility]  Obstetric History and Physical    Chief Complaint   Patient presents with    Elevated Blood Pressure       Subjective     Patient is a 38 y.o. female  currently at 38w1d, who presents for induction of labor  for GHTN.  She had a severe range BP at routine  PNV today, which came down to 140/90 on re-check.  She had a HA yesterday that resolved.  No current HA, vision changes, RUQ/EG pain.  GFM.    Her prenatal care is complicated by IUI pregnancy w/ sperm donor, obesity, AMA with low risk NIPT.  Her previous obstetric/gynecological history is noted for is non-contributory.    The following portions of the patients history were reviewed and updated as appropriate: current medications, allergies, past medical history, past surgical history, past family history, past social history, and problem list .       Prenatal Information:   Maternal Prenatal Labs  Blood Type ABO Type   Date Value Ref Range Status   2024 O  Final      Rh Status RH type   Date Value Ref Range Status   2024 Positive  Final      Antibody Screen Antibody Screen   Date Value Ref Range Status   2024 Negative  Final                        Past OB History:       OB History    Para Term  AB Living   1 0 0 0 0 0   SAB IAB Ectopic Molar Multiple Live Births   0 0 0 0 0 0      # Outcome Date GA Lbr Bronson/2nd Weight Sex Type Anes PTL Lv   1 Current               Obstetric Comments   Preg #1 (IVF)       Past Medical History: Past Medical History:   Diagnosis Date    Asthma     CTS (carpal tunnel syndrome) 2024    Pregnancy related    Polycystic ovary syndrome     Subluxation of patella 2001    Tear of meniscus of knee 2001      Past Surgical History Past Surgical History:   Procedure Laterality Date    EAR TUBES      KNEE SURGERY      left mcl      Family History: Family History   Problem Relation Age of Onset    Diabetes Mother     Osteoporosis Mother     Diabetes Maternal Aunt     Diabetes  Maternal Uncle       Social History:  reports that she has never smoked. She has never used smokeless tobacco.   reports that she does not currently use alcohol.   reports no history of drug use.        REVIEW OF SYSTEMS              Reports fetal movement is normal             Denies leakage of amniotic fluid.             Denies vaginal bleeding             She reports No contractions             All other systems reviewed and are negative    Objective       Vital Signs Range for the last 24 hours  Temperature: Temp:  [98.5 °F (36.9 °C)-98.9 °F (37.2 °C)] 98.5 °F (36.9 °C)   Temp Source: Temp src: Oral   BP: BP: (124-143)/(70-94) 124/70   Pulse: Heart Rate:  [90-96] 96   Respirations: Resp:  [16-18] 16   SPO2:     O2 Amount (l/min):     O2 Devices     Weight: Weight:  [113 kg (250 lb)] 113 kg (250 lb)       Presentation: vtx   Cervix: Exam by:     Dilation:     Effacement:     Station:          Fetal Heart Rate Assessment   Method: Fetal HR Assessment Method: external (poor tracing, pt sitting up eating)   Beats/min: Fetal HR (beats/min): 150   Baseline: Fetal HR Baseline: normal range   Variability: Fetal HR Variability: moderate (amplitude range 6 to 25 bpm)   Accels: Fetal HR Accelerations: greater than/equal to 15 bpm, lasting at least 15 seconds   Decels: Fetal HR Decelerations: absent   Tracing Category:  1     Uterine Assessment   Method: Method: external tocotransducer (poor tracing, pt sitting up eating)   Frequency (min): Contraction Frequency (Minutes): '   Ctx Count in 10 min:     Duration:     Intensity:     Intensity by IUPC:     Resting Tone: Uterine Resting Tone: soft by palpation   Resting Tone by IUPC:     [place] Units:       Constitutional:  Well developed, well nourished, no acute distress, well-groomed.   Uterus: Soft, nontender. Fundus appropriate for dates.  Neurologic:  Alert & oriented x 3,  no focal deficits noted.   Psychiatric:  Speech and behavior appropriate.   Extremities: no  cyanosis, clubbing or edema, no evidence of DVT.        Labs:  Lab Results   Component Value Date    WBC 10.02 05/30/2024    HGB 11.8 (L) 05/30/2024    HCT 36.6 05/30/2024    MCV 89.9 05/30/2024     05/30/2024    GLU 99 11/03/2021    CREATININE 0.43 (L) 05/30/2024    URICACID 3.9 05/30/2024    AST 16 05/30/2024    ALT 16 05/30/2024     05/30/2024     Results from last 7 days   Lab Units 05/30/24  1815   ABO TYPING  O   RH TYPING  Positive   ANTIBODY SCREEN  Negative           Assessment & Plan       Term pregnancy    Gestational hypertension        Assessment:  1.  Intrauterine pregnancy at 38w1d weeks gestation with reactive fetal status.    2.   induction of labor  for GHTN  with unfavorable cervix  3.  Obstetrical history significant for IUI pregnancy w/ sperm donor, obesity, AMA with low risk NIPT.  4.  GBS status:negative    Plan:  Osei bulb for cervical ripening overnight, then pit per protocol.  Epidural when desires.   Monitor BPs.  Magnesium not currently indicated  2.  Plan of care has been reviewed with patient and questions answered.  3.  Risks, benefits of treatment plan have been discussed.  4.  All questions have been answered.        Tess Brunson MD  5/30/2024  19:33 EDT

## 2024-05-31 ENCOUNTER — ANESTHESIA (OUTPATIENT)
Dept: LABOR AND DELIVERY | Facility: HOSPITAL | Age: 39
End: 2024-05-31
Payer: COMMERCIAL

## 2024-05-31 ENCOUNTER — ANESTHESIA EVENT (OUTPATIENT)
Dept: LABOR AND DELIVERY | Facility: HOSPITAL | Age: 39
End: 2024-05-31
Payer: COMMERCIAL

## 2024-05-31 PROCEDURE — 59025 FETAL NON-STRESS TEST: CPT

## 2024-05-31 PROCEDURE — 51702 INSERT TEMP BLADDER CATH: CPT

## 2024-05-31 PROCEDURE — 3E033VJ INTRODUCTION OF OTHER HORMONE INTO PERIPHERAL VEIN, PERCUTANEOUS APPROACH: ICD-10-PCS | Performed by: OBSTETRICS & GYNECOLOGY

## 2024-05-31 PROCEDURE — C1755 CATHETER, INTRASPINAL: HCPCS

## 2024-05-31 PROCEDURE — 25010000002 ROPIVACAINE PER 1 MG: Performed by: ANESTHESIOLOGY

## 2024-05-31 PROCEDURE — 25810000003 LACTATED RINGERS PER 1000 ML: Performed by: OBSTETRICS & GYNECOLOGY

## 2024-05-31 PROCEDURE — 25010000002 BUPIVACAINE (PF) 0.25 % SOLUTION: Performed by: ANESTHESIOLOGY

## 2024-05-31 PROCEDURE — C1755 CATHETER, INTRASPINAL: HCPCS | Performed by: ANESTHESIOLOGY

## 2024-05-31 RX ORDER — CITRIC ACID/SODIUM CITRATE 334-500MG
30 SOLUTION, ORAL ORAL ONCE
Status: DISCONTINUED | OUTPATIENT
Start: 2024-05-31 | End: 2024-06-01 | Stop reason: HOSPADM

## 2024-05-31 RX ORDER — ONDANSETRON 2 MG/ML
4 INJECTION INTRAMUSCULAR; INTRAVENOUS ONCE AS NEEDED
Status: DISCONTINUED | OUTPATIENT
Start: 2024-05-31 | End: 2024-06-01 | Stop reason: HOSPADM

## 2024-05-31 RX ORDER — DIPHENHYDRAMINE HYDROCHLORIDE 50 MG/ML
12.5 INJECTION INTRAMUSCULAR; INTRAVENOUS EVERY 8 HOURS PRN
Status: DISCONTINUED | OUTPATIENT
Start: 2024-05-31 | End: 2024-06-01 | Stop reason: HOSPADM

## 2024-05-31 RX ORDER — FAMOTIDINE 10 MG/ML
20 INJECTION, SOLUTION INTRAVENOUS ONCE AS NEEDED
Status: DISCONTINUED | OUTPATIENT
Start: 2024-05-31 | End: 2024-06-01 | Stop reason: HOSPADM

## 2024-05-31 RX ORDER — ROPIVACAINE HYDROCHLORIDE 2 MG/ML
15 INJECTION, SOLUTION EPIDURAL; INFILTRATION; PERINEURAL CONTINUOUS
Status: DISCONTINUED | OUTPATIENT
Start: 2024-05-31 | End: 2024-06-01

## 2024-05-31 RX ORDER — BUPIVACAINE HYDROCHLORIDE 2.5 MG/ML
INJECTION, SOLUTION EPIDURAL; INFILTRATION; INTRACAUDAL AS NEEDED
Status: DISCONTINUED | OUTPATIENT
Start: 2024-05-31 | End: 2024-06-01 | Stop reason: SURG

## 2024-05-31 RX ORDER — METOCLOPRAMIDE HYDROCHLORIDE 5 MG/ML
10 INJECTION INTRAMUSCULAR; INTRAVENOUS ONCE AS NEEDED
Status: DISCONTINUED | OUTPATIENT
Start: 2024-05-31 | End: 2024-06-01 | Stop reason: HOSPADM

## 2024-05-31 RX ORDER — EPHEDRINE SULFATE 5 MG/ML
10 INJECTION INTRAVENOUS
Status: DISCONTINUED | OUTPATIENT
Start: 2024-05-31 | End: 2024-06-01 | Stop reason: HOSPADM

## 2024-05-31 RX ORDER — EPHEDRINE SULFATE 5 MG/ML
INJECTION INTRAVENOUS
Status: DISCONTINUED
Start: 2024-05-31 | End: 2024-06-02 | Stop reason: HOSPADM

## 2024-05-31 RX ORDER — LIDOCAINE HYDROCHLORIDE AND EPINEPHRINE 15; 5 MG/ML; UG/ML
INJECTION, SOLUTION EPIDURAL AS NEEDED
Status: DISCONTINUED | OUTPATIENT
Start: 2024-05-31 | End: 2024-06-01 | Stop reason: SURG

## 2024-05-31 RX ADMIN — SODIUM CHLORIDE, POTASSIUM CHLORIDE, SODIUM LACTATE AND CALCIUM CHLORIDE 125 ML/HR: 600; 310; 30; 20 INJECTION, SOLUTION INTRAVENOUS at 16:52

## 2024-05-31 RX ADMIN — ROPIVACAINE HYDROCHLORIDE 15 ML/HR: 2 INJECTION, SOLUTION EPIDURAL; INFILTRATION at 16:20

## 2024-05-31 RX ADMIN — SODIUM CHLORIDE, POTASSIUM CHLORIDE, SODIUM LACTATE AND CALCIUM CHLORIDE 125 ML/HR: 600; 310; 30; 20 INJECTION, SOLUTION INTRAVENOUS at 09:20

## 2024-05-31 RX ADMIN — SODIUM CHLORIDE, POTASSIUM CHLORIDE, SODIUM LACTATE AND CALCIUM CHLORIDE 999 ML/HR: 600; 310; 30; 20 INJECTION, SOLUTION INTRAVENOUS at 15:45

## 2024-05-31 RX ADMIN — LIDOCAINE HYDROCHLORIDE AND EPINEPHRINE 2 ML: 15; 5 INJECTION, SOLUTION EPIDURAL at 16:13

## 2024-05-31 RX ADMIN — LIDOCAINE HYDROCHLORIDE AND EPINEPHRINE 3 ML: 15; 5 INJECTION, SOLUTION EPIDURAL at 16:12

## 2024-05-31 RX ADMIN — SODIUM CHLORIDE, POTASSIUM CHLORIDE, SODIUM LACTATE AND CALCIUM CHLORIDE 125 ML/HR: 600; 310; 30; 20 INJECTION, SOLUTION INTRAVENOUS at 01:34

## 2024-05-31 RX ADMIN — BUPIVACAINE HYDROCHLORIDE 12 ML: 2.5 INJECTION, SOLUTION EPIDURAL; INFILTRATION; INTRACAUDAL; PERINEURAL at 16:14

## 2024-05-31 RX ADMIN — Medication 2 MILLI-UNITS/MIN: at 05:22

## 2024-05-31 NOTE — PROCEDURES
38 y.o.  OB History          1    Para   0    Term   0       0    AB   0    Living   0         SAB   0    IAB   0    Ectopic   0    Molar   0    Multiple   0    Live Births   0          Obstetric Comments   Preg #1 (IVF)            Presents at 38 1/7 weeks as an induction of labour due to HTN  Her primary OB requests a Osei Bulb placement to initiate the induction of labour.    Fetal Heart Rate Assessment   Method: Fetal HR Assessment Method: external (poor tracing, pt sitting up eating)   Beats/min: Fetal HR (beats/min): 150   Baseline: Fetal HR Baseline: normal range   Varibility: Fetal HR Variability: moderate (amplitude range 6 to 25 bpm)   Accels: Fetal HR Accelerations: greater than/equal to 15 bpm, lasting at least 15 seconds   Decels: Fetal HR Decelerations: absent   Tracing Category:       TOCO:  irregular   SVE: /-3    A Osei Bulb was placed without difficulties with 60 cc of sterile saline.  The patient tolerated the procedure well.

## 2024-05-31 NOTE — PROGRESS NOTES
"05/31/24  09:12 EDT  Dena Li    SUBJECTIVE: Getting uncomfortable with contractions    ASSESSMENTS:     /84 (BP Location: Right arm, Patient Position: Sitting)   Pulse 90   Temp 98 °F (36.7 °C) (Oral)   Resp 16   Ht 162.6 cm (64\")   Wt 113 kg (250 lb)   LMP  (LMP Unknown)   BMI 42.91 kg/m²     Fetal Heart Rate Assessment   Method: Fetal HR Assessment Method: external   Beats/min: Fetal HR (beats/min): 150   Baseline: Fetal HR Baseline: normal range   Varibility: Fetal HR Variability: moderate (amplitude range 6 to 25 bpm)   Accels: Fetal HR Accelerations: greater than/equal to 15 bpm, lasting at least 15 seconds   Decels: Fetal HR Decelerations: absent   Tracing Category:  1     Uterine Assessment   Method: Method: external tocotransducer   Frequency (min): Contraction Frequency (Minutes): 2-3   Ctx Count in 10 min:     Duration:     Intensity: Contraction Intensity: no contractions   Intensity by IUPC:     Resting Tone: Uterine Resting Tone: soft by palpation   Resting Tone by IUPC:       Presentation: Vertex   Cervix: Exam by: Method: sterile exam per physician   Dilation: Cervical Dilation (cm): 4   Effacement: Cervical Effacement: 60%   Station: Fetal Station:  (ballotable)            IUP at 38w2d   Induction of labor for gestational hypertension  GBS negative  AMA  Obesity          PLAN:  Osei bulb for cervical ripening overnight, now pitocin per protocol.  AROM when fetal head engaged.  Repeat SVE in 4h or prn.  Epidural when desires.       Tess Brunson MD  09:12 EDT  05/31/24     "

## 2024-05-31 NOTE — ANESTHESIA PREPROCEDURE EVALUATION
Anesthesia Evaluation     Patient summary reviewed and Nursing notes reviewed                Airway   Mallampati: II  TM distance: >3 FB  Neck ROM: full  Dental      Pulmonary    (+) asthma,  Cardiovascular - negative cardio ROS        Neuro/Psych- negative ROS  GI/Hepatic/Renal/Endo    (+) morbid obesity    Musculoskeletal (-) negative ROS    Abdominal    Substance History - negative use     OB/GYN    (+) Pregnant, pregnancy induced hypertension        Other                    Anesthesia Plan    ASA 3     epidural       Anesthetic plan, risks, benefits, and alternatives have been provided, discussed and informed consent has been obtained with: patient.    CODE STATUS:    Code Status (Patient has no pulse and is not breathing): CPR (Attempt to Resuscitate)  Medical Interventions (Patient has pulse or is breathing): Full Support

## 2024-05-31 NOTE — PROGRESS NOTES
"05/31/24  13:16 EDT  Dena Li    SUBJECTIVE: managing contractions on birthing ball.  GFM. No LOF or VB    ASSESSMENTS:     /80   Pulse 90   Temp 98.6 °F (37 °C) (Oral)   Resp 18   Ht 162.6 cm (64\")   Wt 113 kg (250 lb)   LMP  (LMP Unknown)   BMI 42.91 kg/m²     Fetal Heart Rate Assessment   Method: Fetal HR Assessment Method: external   Beats/min: Fetal HR (beats/min): 150   Baseline: Fetal HR Baseline: normal range   Varibility: Fetal HR Variability: moderate (amplitude range 6 to 25 bpm)   Accels: Fetal HR Accelerations: greater than/equal to 15 bpm, lasting at least 15 seconds   Decels: Fetal HR Decelerations: absent   Tracing Category:  1     Uterine Assessment   Method: Method: external tocotransducer   Frequency (min): Contraction Frequency (Minutes): 2.5-3   Ctx Count in 10 min:     Duration:     Intensity: Contraction Intensity: no contractions   Intensity by IUPC:     Resting Tone: Uterine Resting Tone: soft by palpation   Resting Tone by IUPC:       Presentation: Vertex   Cervix: Exam by: Method: sterile exam per physician   Dilation: Cervical Dilation (cm): 4   Effacement: Cervical Effacement: 60%   Station: Fetal Station: -2            IUP at 38w2d   Induction of labor for gestational hypertension  GBS negative  AMA  Obesity          PLAN:  Pitocin per protocol.  Repeat SVE in 4h or prn.    Epidural if desires.     Monitor BPs: no indication for magnesium at this time    Tess Brunson MD  13:16 EDT  05/31/24     "

## 2024-05-31 NOTE — PROGRESS NOTES
"05/31/24  16:55 EDT  Dena Li    SUBJECTIVE: comfortable w/ epidural    ASSESSMENTS:     /57   Pulse 89   Temp 97.7 °F (36.5 °C) (Axillary)   Resp 18   Ht 162.6 cm (64\")   Wt 113 kg (250 lb)   LMP  (LMP Unknown)   SpO2 100%   Breastfeeding Yes   BMI 42.91 kg/m²     Fetal Heart Rate Assessment   Method: Fetal HR Assessment Method: other (see comments) (tracing maternal HR, pt bent over during contraction. RN @ BS)   Beats/min: Fetal HR (beats/min): 150   Baseline: Fetal HR Baseline: normal range   Varibility: Fetal HR Variability: moderate (amplitude range 6 to 25 bpm)   Accels: Fetal HR Accelerations: greater than/equal to 15 bpm, lasting at least 15 seconds   Decels: Fetal HR Decelerations: absent   Tracing Category:  1     Uterine Assessment   Method: Method: external tocotransducer   Frequency (min): Contraction Frequency (Minutes): poor tracing   Ctx Count in 10 min:     Duration:     Intensity: Contraction Intensity: no contractions   Intensity by IUPC:     Resting Tone: Uterine Resting Tone: soft by palpation   Resting Tone by IUPC:       Presentation: Vertex   Cervix: Exam by: Method: sterile exam per RN   Dilation: Cervical Dilation (cm): 5   Effacement: Cervical Effacement: 70-80%   Station: Fetal Station: -2            IUP at 38w2d   Induction of labor for gestational hypertension:  mild  GBS negative  AMA  Obesity          PLAN:  Pitocin per protocol.  Repeat SVE in 4h or prn.    Epidural       Monitor BPs: no indication for magnesium at this time    Tess Brunson MD  16:55 EDT  05/31/24     "

## 2024-05-31 NOTE — ANESTHESIA PROCEDURE NOTES
Labor Epidural      Patient reassessed immediately prior to procedure    Patient location during procedure: OB  Performed By  Anesthesiologist: Woodrow Jade DO  Preanesthetic Checklist  Completed: patient identified, IV checked, site marked, risks and benefits discussed, surgical consent, monitors and equipment checked, pre-op evaluation and timeout performed  Prep:  Pt Position:sitting  Sterile Tech:gloves, mask, sterile barrier and cap  Prep:chlorhexidine gluconate and isopropyl alcohol  Monitoring:blood pressure monitoring and continuous pulse oximetry  Epidural Block Procedure:  Approach:midline  Guidance:landmark technique and palpation technique  Location:L3-L4  Needle Type:Tuohy  Needle Gauge:17 G  Loss of Resistance Medium: air  Loss of Resistance: 5cm  Cath Depth at skin:11 cm  Paresthesia: none  Aspiration:negative  Test Dose:negative  Number of Attempts: 1  Post Assessment:  Dressing:secured with tape and occlusive dressing applied (Tegaderm Placed)  Pt Tolerance:patient tolerated the procedure well with no apparent complications  Complications:no

## 2024-06-01 PROBLEM — Z34.90 TERM PREGNANCY: Status: RESOLVED | Noted: 2024-05-30 | Resolved: 2024-06-01

## 2024-06-01 PROCEDURE — 25810000003 LACTATED RINGERS PER 1000 ML: Performed by: OBSTETRICS & GYNECOLOGY

## 2024-06-01 PROCEDURE — 25010000002 ONDANSETRON PER 1 MG: Performed by: OBSTETRICS & GYNECOLOGY

## 2024-06-01 PROCEDURE — 25010000002 CEFAZOLIN PER 500 MG: Performed by: OBSTETRICS & GYNECOLOGY

## 2024-06-01 PROCEDURE — 0KQM0ZZ REPAIR PERINEUM MUSCLE, OPEN APPROACH: ICD-10-PCS | Performed by: OBSTETRICS & GYNECOLOGY

## 2024-06-01 RX ORDER — HYDROCODONE BITARTRATE AND ACETAMINOPHEN 10; 325 MG/1; MG/1
1 TABLET ORAL EVERY 4 HOURS PRN
Status: DISCONTINUED | OUTPATIENT
Start: 2024-06-01 | End: 2024-06-02 | Stop reason: HOSPADM

## 2024-06-01 RX ORDER — OXYTOCIN/0.9 % SODIUM CHLORIDE 30/500 ML
250 PLASTIC BAG, INJECTION (ML) INTRAVENOUS CONTINUOUS
Status: ACTIVE | OUTPATIENT
Start: 2024-06-01 | End: 2024-06-01

## 2024-06-01 RX ORDER — SODIUM CHLORIDE 0.9 % (FLUSH) 0.9 %
1-10 SYRINGE (ML) INJECTION AS NEEDED
Status: DISCONTINUED | OUTPATIENT
Start: 2024-06-01 | End: 2024-06-02 | Stop reason: HOSPADM

## 2024-06-01 RX ORDER — METHYLERGONOVINE MALEATE 0.2 MG/ML
200 INJECTION INTRAVENOUS ONCE AS NEEDED
Status: DISCONTINUED | OUTPATIENT
Start: 2024-06-01 | End: 2024-06-01 | Stop reason: HOSPADM

## 2024-06-01 RX ORDER — PRENATAL VIT/IRON FUM/FOLIC AC 27MG-0.8MG
1 TABLET ORAL DAILY
Status: DISCONTINUED | OUTPATIENT
Start: 2024-06-01 | End: 2024-06-02 | Stop reason: HOSPADM

## 2024-06-01 RX ORDER — DOCUSATE SODIUM 100 MG/1
100 CAPSULE, LIQUID FILLED ORAL 2 TIMES DAILY
Status: DISCONTINUED | OUTPATIENT
Start: 2024-06-01 | End: 2024-06-02 | Stop reason: HOSPADM

## 2024-06-01 RX ORDER — CARBOPROST TROMETHAMINE 250 UG/ML
250 INJECTION, SOLUTION INTRAMUSCULAR AS NEEDED
Status: DISCONTINUED | OUTPATIENT
Start: 2024-06-01 | End: 2024-06-01 | Stop reason: HOSPADM

## 2024-06-01 RX ORDER — HYDROCORTISONE 25 MG/G
1 CREAM TOPICAL AS NEEDED
Status: DISCONTINUED | OUTPATIENT
Start: 2024-06-01 | End: 2024-06-02 | Stop reason: HOSPADM

## 2024-06-01 RX ORDER — OXYTOCIN/0.9 % SODIUM CHLORIDE 30/500 ML
125 PLASTIC BAG, INJECTION (ML) INTRAVENOUS ONCE AS NEEDED
Status: DISCONTINUED | OUTPATIENT
Start: 2024-06-01 | End: 2024-06-02 | Stop reason: HOSPADM

## 2024-06-01 RX ORDER — SIMETHICONE 80 MG
80 TABLET,CHEWABLE ORAL 4 TIMES DAILY PRN
Status: DISCONTINUED | OUTPATIENT
Start: 2024-06-01 | End: 2024-06-02 | Stop reason: HOSPADM

## 2024-06-01 RX ORDER — OXYTOCIN/0.9 % SODIUM CHLORIDE 30/500 ML
999 PLASTIC BAG, INJECTION (ML) INTRAVENOUS ONCE
Status: DISCONTINUED | OUTPATIENT
Start: 2024-06-01 | End: 2024-06-02 | Stop reason: HOSPADM

## 2024-06-01 RX ORDER — POLYETHYLENE GLYCOL 3350 17 G/17G
17 POWDER, FOR SOLUTION ORAL DAILY PRN
Status: DISCONTINUED | OUTPATIENT
Start: 2024-06-01 | End: 2024-06-02 | Stop reason: HOSPADM

## 2024-06-01 RX ORDER — CARBOPROST TROMETHAMINE 250 UG/ML
INJECTION, SOLUTION INTRAMUSCULAR
Status: COMPLETED
Start: 2024-06-01 | End: 2024-06-01

## 2024-06-01 RX ORDER — MISOPROSTOL 200 UG/1
800 TABLET ORAL AS NEEDED
Status: DISCONTINUED | OUTPATIENT
Start: 2024-06-01 | End: 2024-06-01 | Stop reason: HOSPADM

## 2024-06-01 RX ORDER — IBUPROFEN 600 MG/1
600 TABLET ORAL EVERY 6 HOURS PRN
Status: DISCONTINUED | OUTPATIENT
Start: 2024-06-01 | End: 2024-06-01 | Stop reason: HOSPADM

## 2024-06-01 RX ORDER — BISACODYL 10 MG
10 SUPPOSITORY, RECTAL RECTAL DAILY PRN
Status: DISCONTINUED | OUTPATIENT
Start: 2024-06-02 | End: 2024-06-02 | Stop reason: HOSPADM

## 2024-06-01 RX ORDER — ACETAMINOPHEN 325 MG/1
650 TABLET ORAL EVERY 6 HOURS PRN
Status: DISCONTINUED | OUTPATIENT
Start: 2024-06-01 | End: 2024-06-02 | Stop reason: HOSPADM

## 2024-06-01 RX ORDER — ONDANSETRON 4 MG/1
4 TABLET, ORALLY DISINTEGRATING ORAL EVERY 6 HOURS PRN
Status: DISCONTINUED | OUTPATIENT
Start: 2024-06-01 | End: 2024-06-02 | Stop reason: HOSPADM

## 2024-06-01 RX ORDER — MISOPROSTOL 200 UG/1
TABLET ORAL
Status: COMPLETED
Start: 2024-06-01 | End: 2024-06-01

## 2024-06-01 RX ORDER — HYDROCODONE BITARTRATE AND ACETAMINOPHEN 5; 325 MG/1; MG/1
1 TABLET ORAL EVERY 4 HOURS PRN
Status: DISCONTINUED | OUTPATIENT
Start: 2024-06-01 | End: 2024-06-02 | Stop reason: HOSPADM

## 2024-06-01 RX ORDER — ONDANSETRON 2 MG/ML
4 INJECTION INTRAMUSCULAR; INTRAVENOUS EVERY 6 HOURS PRN
Status: DISCONTINUED | OUTPATIENT
Start: 2024-06-01 | End: 2024-06-02 | Stop reason: HOSPADM

## 2024-06-01 RX ORDER — IBUPROFEN 600 MG/1
600 TABLET ORAL EVERY 6 HOURS PRN
Status: DISCONTINUED | OUTPATIENT
Start: 2024-06-01 | End: 2024-06-02 | Stop reason: HOSPADM

## 2024-06-01 RX ADMIN — ACETAMINOPHEN 650 MG: 325 TABLET ORAL at 05:39

## 2024-06-01 RX ADMIN — SODIUM CHLORIDE 2000 MG: 900 INJECTION INTRAVENOUS at 04:30

## 2024-06-01 RX ADMIN — IBUPROFEN 600 MG: 600 TABLET, FILM COATED ORAL at 09:51

## 2024-06-01 RX ADMIN — ONDANSETRON 4 MG: 2 INJECTION INTRAMUSCULAR; INTRAVENOUS at 00:12

## 2024-06-01 RX ADMIN — WITCH HAZEL: 500 SOLUTION RECTAL; TOPICAL at 06:49

## 2024-06-01 RX ADMIN — Medication: at 06:49

## 2024-06-01 RX ADMIN — IBUPROFEN 600 MG: 600 TABLET, FILM COATED ORAL at 16:35

## 2024-06-01 RX ADMIN — PRENATAL VITAMINS-IRON FUMARATE 27 MG IRON-FOLIC ACID 0.8 MG TABLET 1 TABLET: at 09:51

## 2024-06-01 RX ADMIN — Medication 1 APPLICATION: at 06:49

## 2024-06-01 RX ADMIN — ACETAMINOPHEN 650 MG: 325 TABLET ORAL at 16:36

## 2024-06-01 RX ADMIN — CARBOPROST TROMETHAMINE 250 MCG: 250 SOLUTION INTRAMUSCULAR at 04:17

## 2024-06-01 RX ADMIN — HYDROCORTISONE 2.5% 1 APPLICATION: 25 CREAM TOPICAL at 06:49

## 2024-06-01 RX ADMIN — MISOPROSTOL 800 MCG: 200 TABLET ORAL at 04:23

## 2024-06-01 RX ADMIN — Medication 999 MILLI-UNITS/MIN: at 04:21

## 2024-06-01 RX ADMIN — SODIUM CHLORIDE, POTASSIUM CHLORIDE, SODIUM LACTATE AND CALCIUM CHLORIDE 125 ML/HR: 600; 310; 30; 20 INJECTION, SOLUTION INTRAVENOUS at 00:11

## 2024-06-01 NOTE — L&D DELIVERY NOTE
" [facility]   Vaginal Delivery Note    Patient Name: Dena Li  : 1985  MRN: [id number]    Date of Delivery: 2024     Diagnosis     Pre & Post-Delivery:  Intrauterine pregnancy at 38w3d  Labor status: Induced Onset of Labor     Gestational hypertension     (spontaneous vaginal delivery)             Problem List    Transfer to Postpartum     Review the Delivery Report for details.     Delivery     Delivery: Vaginal, Spontaneous     YOB: 2025    Time of Birth:  Gestational Age 4:01 AM   38w3d     Anesthesia: Epidural     Delivering clinician: Tess Brunson    Forceps?   No   Vacuum? No    Shoulder dystocia present: No        Delivery narrative:  Uncomplicated  over a second-degree perineal laceration.  Anterior shoulder delivered with ease.  Body delivered atraumatically.  Infant vigorous at delivery so placed on maternal abdomen.  Delayed cord clamping x 1 min.  Cord doubly clamped and cut by FOB.  Laceration repaired in standard fashion using 3-0 vicryl rapide. Placenta delivered intact with gentle cord traction.  Uterine atony was noted the patient was given Hemabate x 1 and 800 mcg of Cytotec.  Bimanual sweep performed.  No evidence of retained placenta.  Ancef 2 g given. uterus firm and lochia light      Infant     Findings: male  infant     Infant observations: Weight: 3430 g (7 lb 9 oz)   Length: 20  in  Observations/Comments:        Apgars: 7  @ 1 minute /    9  @ 5 minutes   Infant Name: Pineda     Placenta & Cord         Placenta delivered  Spontaneous  at   2024  4:12 AM     Cord: 3 vessels  present.   Nuchal Cord?  yes; Number of nuchal loops present:  1    Cord blood obtained: Yes    Cord gases obtained:  No    Cord gas results: Venous:  No results found for: \"PHCVEN\", \"BECVEN\"    Arterial:  No results found for: \"PHCART\", \"BECART\"     Repair     Episiotomy: Not recorded     No    Lacerations: Yes  Laceration Information  Laceration Repaired? "   Perineal: 2nd  Yes    Periurethral:       Labial:       Sulcus:       Vaginal:       Cervical:         Suture used for repair: 3-0 Vicryl rapide  L    Estimated Blood Loss:       Quantitative Blood Loss:     QBL from VAG DEL: 500 (06/01/24 0442)     Complications     none    Disposition     Mother to Mother Baby/Postpartum  in stable condition currently.  Baby to remains with mom  in stable condition currently.    Tess Brunson MD  06/01/24  09:18 EDT

## 2024-06-01 NOTE — ANESTHESIA POSTPROCEDURE EVALUATION
Patient: Dena Li    Procedure Summary       Date: 05/31/24 Room / Location:     Anesthesia Start: 1601 Anesthesia Stop: 06/01/24 0412    Procedure: LABOR ANALGESIA Diagnosis:     Scheduled Providers:  Provider: Woodrow Jade DO    Anesthesia Type: epidural ASA Status: 3            Anesthesia Type: epidural    Vitals  Vitals Value Taken Time   /75 06/01/24 0740   Temp 98.2 °F (36.8 °C) 06/01/24 0740   Pulse 98 06/01/24 0740   Resp 16 06/01/24 0740   SpO2 100 % 05/31/24 1925           Post Anesthesia Care and Evaluation    Patient location during evaluation: bedside  Patient participation: complete - patient participated  Level of consciousness: awake and awake and alert  Pain score: 0  Pain management: satisfactory to patient    Airway patency: patent  Anesthetic complications: No anesthetic complications  PONV Status: none  Cardiovascular status: acceptable, hemodynamically stable and stable  Respiratory status: acceptable  Hydration status: stable  Post Neuraxial Block status: Motor and sensory function returned to baseline and No signs or symptoms of PDPH

## 2024-06-01 NOTE — PROGRESS NOTES
"06/01/24  02:10 EDT  Dena Li      ASSESSMENTS:  Now complete and -1/0.  Will allow her to labor down and recheck in 30 minutes.  Dr. Brunson aware    /75   Pulse 101   Temp 98.5 °F (36.9 °C) (Oral)   Resp 20   Ht 162.6 cm (64\")   Wt 113 kg (250 lb)   LMP  (LMP Unknown)   SpO2 100%   Breastfeeding Yes   BMI 42.91 kg/m²     Fetal Heart Rate Assessment   Method: Fetal HR Assessment Method: external   Beats/min: Fetal HR (beats/min): 145   Baseline: Fetal HR Baseline: normal range   Varibility: Fetal HR Variability: moderate (amplitude range 6 to 25 bpm)   Accels: Fetal HR Accelerations: greater than/equal to 15 bpm, lasting at least 15 seconds   Decels: Fetal HR Decelerations: absent   Tracing Category:  Category 1     Uterine Assessment   Method: Method: external tocotransducer   Frequency (min): Contraction Frequency (Minutes): poor tracing   Ctx Count in 10 min:     Duration:     Intensity: Contraction Intensity: strong by palpation   Intensity by IUPC:     Resting Tone: Uterine Resting Tone: soft by palpation   Resting Tone by IUPC:     [place] Units:                                Presentation: vertex   Cervix: Exam by: Method: sterile exam per RN   Dilation: 10   Effacement: 100   Station: Fetal Station: -1            Lab Results   Component Value Date    WBC 10.02 05/30/2024    HGB 11.8 (L) 05/30/2024    HCT 36.6 05/30/2024    MCV 89.9 05/30/2024     05/30/2024    GLU 99 11/03/2021    CREATININE 0.43 (L) 05/30/2024    URICACID 3.9 05/30/2024    AST 16 05/30/2024    ALT 16 05/30/2024     05/30/2024     Results from last 7 days   Lab Units 05/30/24  1815   ABO TYPING  O   RH TYPING  Positive   ANTIBODY SCREEN  Negative       PLAN: labor down until she feels more pressure    Linda Gaspar CNM  02:10 EDT  06/01/24  "

## 2024-06-01 NOTE — PROGRESS NOTES
"06/01/24  03:47 EDT  Dena Li    SUBJECTIVE: pushing w/ excellent effort     ASSESSMENTS:     /75   Pulse 101   Temp 98.5 °F (36.9 °C) (Oral)   Resp 20   Ht 162.6 cm (64\")   Wt 113 kg (250 lb)   LMP  (LMP Unknown)   SpO2 100%   Breastfeeding Yes   BMI 42.91 kg/m²     Fetal Heart Rate Assessment   Method: Fetal HR Assessment Method: external   Beats/min: Fetal HR (beats/min): 140   Baseline: Fetal HR Baseline: normal range   Varibility: Fetal HR Variability: moderate (amplitude range 6 to 25 bpm)   Accels: Fetal HR Accelerations: greater than/equal to 15 bpm, lasting at least 15 seconds   Decels: Fetal HR Decelerations: absent   Tracing Category:  1     Uterine Assessment   Method: Method: external tocotransducer   Frequency (min): Contraction Frequency (Minutes): 2-3   Ctx Count in 10 min:     Duration:     Intensity: Contraction Intensity: strong by palpation   Intensity by IUPC:     Resting Tone: Uterine Resting Tone: soft by palpation   Resting Tone by IUPC:       Presentation: Vertex   Cervix: Exam by: Method: sterile exam per CNM (hunter)   Dilation: Cervical Dilation (cm): 10   Effacement: Cervical Effacement: 100%   Station: Fetal Station: +2            IUP at 38w3d   Induction of labor for gestational hypertension:  mild  GBS negative  AMA  Obesity          PLAN:  Continue pushing.  Making good progress    Tess Brunson MD  03:47 EDT  06/01/24     "

## 2024-06-01 NOTE — LACTATION NOTE
06/01/24 1320   Maternal Information   Date of Referral 06/01/24   Person Making Referral lactation consultant  (courtesy visit, newly postpartum)   Maternal Reason for Referral no prior breastfeeding experience  (pt reports breastfeeding is going well and latch is comfortable)   Maternal Infant Feeding   Maternal Emotional State independent;receptive;relaxed;other (see comments)  (visitors in room; encouraged calling out for latch check)   Milk Expression/Equipment   Breast Pump Type double electric, personal  (wearable Lansinoh pump)   Breast Pumping   Breast Pumping Interventions post-feed pumping encouraged  (for short/missed feedings, if supplementation is required, or if breastfeeding becomes too painful, to encourage breastmilk production)     Completed breastfeeding education encouraging pt to achieve a deep, comfortable latch throughout breastfeeding, which should be at least every 3 hours while giving baby stimulation for high quality transfer of breastmilk. Alternatively, pumping encouraged every three hours, or at baby's feeding times for optimal milk initiation/production. All questions answered at this time, PRN Lactation Consultant/Clinic contact encouraged

## 2024-06-02 VITALS
OXYGEN SATURATION: 100 % | WEIGHT: 250 LBS | DIASTOLIC BLOOD PRESSURE: 70 MMHG | SYSTOLIC BLOOD PRESSURE: 109 MMHG | TEMPERATURE: 98.1 F | HEART RATE: 83 BPM | BODY MASS INDEX: 42.68 KG/M2 | RESPIRATION RATE: 16 BRPM | HEIGHT: 64 IN

## 2024-06-02 LAB
HCT VFR BLD AUTO: 28.7 % (ref 34–46.6)
HGB BLD-MCNC: 9.3 G/DL (ref 12–15.9)

## 2024-06-02 PROCEDURE — 85018 HEMOGLOBIN: CPT | Performed by: OBSTETRICS & GYNECOLOGY

## 2024-06-02 PROCEDURE — 85014 HEMATOCRIT: CPT | Performed by: OBSTETRICS & GYNECOLOGY

## 2024-06-02 RX ADMIN — PRENATAL VITAMINS-IRON FUMARATE 27 MG IRON-FOLIC ACID 0.8 MG TABLET 1 TABLET: at 09:11

## 2024-06-02 RX ADMIN — DOCUSATE SODIUM 100 MG: 100 CAPSULE, LIQUID FILLED ORAL at 09:10

## 2024-06-02 RX ADMIN — IBUPROFEN 600 MG: 600 TABLET, FILM COATED ORAL at 09:11

## 2024-06-02 RX ADMIN — IBUPROFEN 600 MG: 600 TABLET, FILM COATED ORAL at 01:34

## 2024-06-02 NOTE — LACTATION NOTE
06/02/24 0909   Maternal Information   Date of Referral 06/02/24   Person Making Referral lactation consultant  (courtesy visit prior to discharge)   Maternal Reason for Referral   (mother has decided just to formula feed infant; no concerns/needs at this time; provided tips on how to dry up breast milk)     Mother has decided that formula feeding is best for her at this time; no other needs/concerns at this time.

## 2024-06-02 NOTE — PLAN OF CARE
Goal Outcome Evaluation:  Plan of Care Reviewed With: patient        Progress: improving  Outcome Evaluation: VSS,fundus/lochia/perineum WDL,pain controlled with prn pain medication,voiding without difficulty,utilizing gabrielle products

## 2024-06-02 NOTE — DISCHARGE SUMMARY
[facility]  Vaginal delivery discharge summary      Patient: Dena Li      MR#:[id number]  Admission  Diagnosis: Present on Admission:   Gestational hypertension     Discharge Diagnosis: Active and Resolved Problems  Active Hospital Problems    Diagnosis  POA     (spontaneous vaginal delivery) [O80]  Not Applicable    Gestational hypertension [O13.9]  Yes      Resolved Hospital Problems    Diagnosis Date Resolved POA    **Term pregnancy [Z34.90] 2024 Not Applicable            Date of Admission: 2024  Date of Discharge:  2024    Procedures:  Vaginal, Spontaneous     2024    4:01 AM      Service:  Obstetrics    Hospital Course/Assessment:  Patient underwent vaginal delivery and remained in the hospital for 3 days.  During that time she remained afebrile and hemodynamically stable.  On the day of discharge, she was eating, ambulating and voiding without difficulty.  Fundus firm, lochia small, perineum intact. bottle feeding and infant is doing well.  She is in no acute distress and ready for early discharge.    Vitals:    24 0813   BP: 109/70   Pulse: 83   Resp: 16   Temp: 98.1 °F (36.7 °C)   SpO2:          Labs:    Lab Results   Component Value Date    WBC 10.02 2024    HGB 9.3 (L) 2024    HCT 28.7 (L) 2024    MCV 89.9 2024     2024    GLU 99 2021    CREATININE 0.43 (L) 2024    URICACID 3.9 2024    AST 16 2024    ALT 16 2024     2024     Results from last 7 days   Lab Units 24  1815   ABO TYPING  O   RH TYPING  Positive   ANTIBODY SCREEN  Negative          Discharge Medications        Continue These Medications        Instructions Start Date   ferrous sulfate 325 (65 Fe) MG tablet       fish oil 1200 MG capsule capsule       LOW-DOSE ASPIRIN PO       PRENATAL + COMPLETE MULTI PO              Stop These Medications      azithromycin 250 MG tablet  Commonly known as: ZITHROMAX                 Discharge Disposition:  To Home    Discharge Condition:  Stable    Discharge Diet: Regular    Activity at Discharge: Pelvic rest    Follow-up Appointments  Follow up with Dr. Brunson in 6 weeks.     Linda Gaspar CNM  06/02/24  11:06 EDT

## 2024-06-04 ENCOUNTER — HOSPITAL ENCOUNTER (OUTPATIENT)
Facility: HOSPITAL | Age: 39
Discharge: HOME | End: 2024-06-04
Attending: OBSTETRICS & GYNECOLOGY | Admitting: STUDENT IN AN ORGANIZED HEALTH CARE EDUCATION/TRAINING PROGRAM
Payer: COMMERCIAL

## 2024-06-04 VITALS
DIASTOLIC BLOOD PRESSURE: 83 MMHG | TEMPERATURE: 98 F | OXYGEN SATURATION: 96 % | SYSTOLIC BLOOD PRESSURE: 131 MMHG | RESPIRATION RATE: 18 BRPM | HEART RATE: 91 BPM

## 2024-06-04 PROBLEM — R03.0 ELEVATED BLOOD PRESSURE READING: Status: ACTIVE | Noted: 2024-06-04

## 2024-06-04 LAB
ALP SERPL-CCNC: 87 U/L (ref 39–117)
ALT SERPL W P-5'-P-CCNC: 20 U/L (ref 1–33)
AST SERPL-CCNC: 25 U/L (ref 1–32)
BILIRUB SERPL-MCNC: 0.2 MG/DL (ref 0–1.2)
CREAT SERPL-MCNC: 0.56 MG/DL (ref 0.57–1)
DEPRECATED RDW RBC AUTO: 48.4 FL (ref 37–54)
ERYTHROCYTE [DISTWIDTH] IN BLOOD BY AUTOMATED COUNT: 15.2 % (ref 12.3–15.4)
HCT VFR BLD AUTO: 30.3 % (ref 34–46.6)
HGB BLD-MCNC: 10 G/DL (ref 12–15.9)
LDH SERPL-CCNC: 181 U/L (ref 135–214)
MCH RBC QN AUTO: 28.8 PG (ref 26.6–33)
MCHC RBC AUTO-ENTMCNC: 33 G/DL (ref 31.5–35.7)
MCV RBC AUTO: 87.3 FL (ref 79–97)
PLATELET # BLD AUTO: 274 10*3/MM3 (ref 140–450)
PMV BLD AUTO: 10.2 FL (ref 6–12)
RBC # BLD AUTO: 3.47 10*6/MM3 (ref 3.77–5.28)
URATE SERPL-MCNC: 4.4 MG/DL (ref 2.4–5.7)
WBC NRBC COR # BLD AUTO: 8.71 10*3/MM3 (ref 3.4–10.8)

## 2024-06-04 PROCEDURE — 96375 TX/PRO/DX INJ NEW DRUG ADDON: CPT

## 2024-06-04 PROCEDURE — 84550 ASSAY OF BLOOD/URIC ACID: CPT | Performed by: STUDENT IN AN ORGANIZED HEALTH CARE EDUCATION/TRAINING PROGRAM

## 2024-06-04 PROCEDURE — 84450 TRANSFERASE (AST) (SGOT): CPT | Performed by: STUDENT IN AN ORGANIZED HEALTH CARE EDUCATION/TRAINING PROGRAM

## 2024-06-04 PROCEDURE — 84075 ASSAY ALKALINE PHOSPHATASE: CPT | Performed by: STUDENT IN AN ORGANIZED HEALTH CARE EDUCATION/TRAINING PROGRAM

## 2024-06-04 PROCEDURE — 83615 LACTATE (LD) (LDH) ENZYME: CPT | Performed by: STUDENT IN AN ORGANIZED HEALTH CARE EDUCATION/TRAINING PROGRAM

## 2024-06-04 PROCEDURE — 25010000002 DIPHENHYDRAMINE PER 50 MG: Performed by: STUDENT IN AN ORGANIZED HEALTH CARE EDUCATION/TRAINING PROGRAM

## 2024-06-04 PROCEDURE — 82565 ASSAY OF CREATININE: CPT | Performed by: STUDENT IN AN ORGANIZED HEALTH CARE EDUCATION/TRAINING PROGRAM

## 2024-06-04 PROCEDURE — G0463 HOSPITAL OUTPT CLINIC VISIT: HCPCS

## 2024-06-04 PROCEDURE — 25010000002 METOCLOPRAMIDE PER 10 MG: Performed by: STUDENT IN AN ORGANIZED HEALTH CARE EDUCATION/TRAINING PROGRAM

## 2024-06-04 PROCEDURE — 84460 ALANINE AMINO (ALT) (SGPT): CPT | Performed by: STUDENT IN AN ORGANIZED HEALTH CARE EDUCATION/TRAINING PROGRAM

## 2024-06-04 PROCEDURE — G0378 HOSPITAL OBSERVATION PER HR: HCPCS

## 2024-06-04 PROCEDURE — 96374 THER/PROPH/DIAG INJ IV PUSH: CPT

## 2024-06-04 PROCEDURE — 25010000002 KETOROLAC TROMETHAMINE PER 15 MG: Performed by: STUDENT IN AN ORGANIZED HEALTH CARE EDUCATION/TRAINING PROGRAM

## 2024-06-04 PROCEDURE — 82247 BILIRUBIN TOTAL: CPT | Performed by: STUDENT IN AN ORGANIZED HEALTH CARE EDUCATION/TRAINING PROGRAM

## 2024-06-04 PROCEDURE — 85027 COMPLETE CBC AUTOMATED: CPT | Performed by: STUDENT IN AN ORGANIZED HEALTH CARE EDUCATION/TRAINING PROGRAM

## 2024-06-04 RX ORDER — DIPHENHYDRAMINE HYDROCHLORIDE 50 MG/ML
25 INJECTION INTRAMUSCULAR; INTRAVENOUS ONCE
Status: COMPLETED | OUTPATIENT
Start: 2024-06-04 | End: 2024-06-04

## 2024-06-04 RX ORDER — SODIUM CHLORIDE 0.9 % (FLUSH) 0.9 %
10 SYRINGE (ML) INJECTION AS NEEDED
Status: DISCONTINUED | OUTPATIENT
Start: 2024-06-04 | End: 2024-06-05 | Stop reason: HOSPADM

## 2024-06-04 RX ORDER — IBUPROFEN 800 MG/1
800 TABLET ORAL EVERY 8 HOURS PRN
COMMUNITY

## 2024-06-04 RX ORDER — LABETALOL 200 MG/1
400 TABLET, FILM COATED ORAL ONCE
Status: COMPLETED | OUTPATIENT
Start: 2024-06-04 | End: 2024-06-04

## 2024-06-04 RX ORDER — METOCLOPRAMIDE HYDROCHLORIDE 5 MG/ML
10 INJECTION INTRAMUSCULAR; INTRAVENOUS ONCE
Status: COMPLETED | OUTPATIENT
Start: 2024-06-04 | End: 2024-06-04

## 2024-06-04 RX ORDER — KETOROLAC TROMETHAMINE 30 MG/ML
30 INJECTION, SOLUTION INTRAMUSCULAR; INTRAVENOUS ONCE
Status: COMPLETED | OUTPATIENT
Start: 2024-06-04 | End: 2024-06-04

## 2024-06-04 RX ORDER — SODIUM CHLORIDE 0.9 % (FLUSH) 0.9 %
10 SYRINGE (ML) INJECTION EVERY 12 HOURS SCHEDULED
Status: DISCONTINUED | OUTPATIENT
Start: 2024-06-04 | End: 2024-06-05 | Stop reason: HOSPADM

## 2024-06-04 RX ORDER — LIDOCAINE HYDROCHLORIDE 10 MG/ML
0.5 INJECTION, SOLUTION EPIDURAL; INFILTRATION; INTRACAUDAL; PERINEURAL ONCE AS NEEDED
Status: DISCONTINUED | OUTPATIENT
Start: 2024-06-04 | End: 2024-06-05 | Stop reason: HOSPADM

## 2024-06-04 RX ORDER — LABETALOL 100 MG/1
200 TABLET, FILM COATED ORAL 2 TIMES DAILY
Qty: 120 TABLET | Refills: 1 | Status: SHIPPED | OUTPATIENT
Start: 2024-06-04 | End: 2024-08-03

## 2024-06-04 RX ORDER — SODIUM CHLORIDE 9 MG/ML
40 INJECTION, SOLUTION INTRAVENOUS AS NEEDED
Status: DISCONTINUED | OUTPATIENT
Start: 2024-06-04 | End: 2024-06-05 | Stop reason: HOSPADM

## 2024-06-04 RX ADMIN — DIPHENHYDRAMINE HYDROCHLORIDE 25 MG: 50 INJECTION INTRAMUSCULAR; INTRAVENOUS at 22:50

## 2024-06-04 RX ADMIN — METOCLOPRAMIDE 10 MG: 5 INJECTION, SOLUTION INTRAMUSCULAR; INTRAVENOUS at 22:51

## 2024-06-04 RX ADMIN — KETOROLAC TROMETHAMINE 30 MG: 30 INJECTION, SOLUTION INTRAMUSCULAR; INTRAVENOUS at 22:51

## 2024-06-04 RX ADMIN — LABETALOL HYDROCHLORIDE 400 MG: 200 TABLET, FILM COATED ORAL at 23:20

## 2024-06-05 NOTE — H&P
[facility]  Obstetric History and Physical    Chief Complaint   Patient presents with    Hypertension       Subjective     Patient is a 39 y.o. female  currently at PPD #4, who presents with HA and HTN. Her prenatal care is notable for: gHTN arising at 38wks.    Mild HA over last day. No vision changes or RUQ pain. No new particular swelling. Elevated home BP, presented for evaluation.      Prenatal Information:  Prenatal Results       Initial Prenatal Labs       Test Value Reference Range Date Time    Hemoglobin  12.5 g/dL 12.0 - 15.9 23 0943    Hematocrit  37.6 % 34.0 - 46.6 23 09    Platelets  280 10*3/mm3 140 - 450 23 0943    Rubella IgG  1.37 index Immune >0.99 23 09    Hepatitis B SAg  Non-Reactive  Non-Reactive 23 09    Hepatitis C Ab  Non-Reactive  Non-Reactive 23 09    RPR        T. Pallidum Ab   Non-Reactive  Non-Reactive 24    ABO  O   24    Rh  Positive   24    Antibody Screen  Negative   23    HIV  Non-Reactive  Non-Reactive 23 09    Urine Culture  No growth   23    Gonorrhea  Negative  Negative 23 0943    Chlamydia  Negative  Negative 23 0943    TSH        HgB A1c   5.40 % 4.80 - 5.60 23 0943    Varicella IgG  536 index Immune >165 23 0943    HgB Electrophoresis         Hemoglobinopathy (genetic testing)        Cystic fibrosis                   Fetal testing        Test Value Reference Range Date Time    NIPT        MSAFP        AFP-4                  2nd and 3rd Trimester       Test Value Reference Range Date Time    Hemoglobin (repeated)  9.3 g/dL 12.0 - 15.9 24 0706       11.8 g/dL 12.0 - 15.9 24 181       10.7 g/dL 12.0 - 15.9 03/15/24 1031    Hematocrit (repeated)  28.7 % 34.0 - 46.6 24 0706       36.6 % 34.0 - 46.6 24 1815       32.5 % 34.0 - 46.6 03/15/24 1031    Platelets   209 10*3/mm3 140 - 450 24 9053       228 10*3/mm3 140  - 450 03/15/24 1031       280 10*3/mm3 140 - 450 11/29/23 0943    1 hour GTT   121 mg/dL 65 - 139 03/15/24 1031    Antibody Screen (repeated)  Negative   05/30/24 1815       Negative   03/15/24 1031    3rd TM syphilis scrn (repeated)  RPR         3rd TM syphilis scrn (repeated) FTA        GTT Fasting        GTT 1 Hr        GTT 2 Hr        GTT 3 Hr        Group B Strep ^ Negative   05/19/24               Other testing        Test Value Reference Range Date Time    Parvo IgG         CMV IgG                   Drug Screening       Test Value Reference Range Date Time    Amphetamine Screen  Negative  Negative 11/29/23 0943    Barbiturate Screen  Negative  Negative 11/29/23 0943    Benzodiazepine Screen  Negative  Negative 11/29/23 0943    Methadone Screen  Negative  Negative 11/29/23 0943    Phencyclidine Screen  Negative  Negative 11/29/23 0943    Opiates Screen  Negative  Negative 11/29/23 0943    THC Screen  Negative  Negative 11/29/23 0943    Cocaine Screen  Negative  Negative 11/29/23 0943    Propoxyphene Screen        Buprenorphine Screen  Negative  Negative 11/29/23 0943    Methamphetamine Screen  Negative  Negative 11/29/23 0943    Oxycodone Screen  Negative  Negative 11/29/23 0943    Tricyclic Antidepressants Screen  Negative  Negative 11/29/23 0943              Legend    ^: Historical                          External Prenatal Results       Pregnancy Outside Results - Transcribed From Office Records - See Scanned Records For Details       Test Value Date Time    ABO  O  05/30/24 1815    Rh  Positive  05/30/24 1815    Antibody Screen  Negative  05/30/24 1815       Negative  03/15/24 1031       Negative  11/29/23 0943    Varicella IgG  536 index 11/29/23 0943    Rubella  1.37 index 11/29/23 0943    Hgb  9.3 g/dL 06/02/24 0706       11.8 g/dL 05/30/24 1815       10.7 g/dL 03/15/24 1031       12.5 g/dL 11/29/23 0943    Hct  28.7 % 06/02/24 0706       36.6 % 05/30/24 1815       32.5 % 03/15/24 1031       37.6 %  23 0943    HgB A1c   5.40 % 23 0943    1h GTT  121 mg/dL 03/15/24 1031    3h GTT Fasting       3h GTT 1 hour       3h GTT 2 hour       3h GTT 3 hour        Gonorrhea (discrete)  Negative  23 0943    Chlamydia (discrete)  Negative  23 0943    RPR       Syphilis Antibody       HBsAg  Non-Reactive  23 0943    Herpes Simplex Virus PCR       Herpes Simplex VIrus Culture       HIV  Non-Reactive  23 0943    Hep C RNA Quant PCR       Hep C Antibody  Non-Reactive  23 0943    AFP       NIPT       Cystic Fibroisis        Group B Strep ^ Negative  24     GBS Susceptibility to Clindamycin       GBS Susceptibility to Erythromycin       Fetal Fibronectin       Genetic Testing, Maternal Blood                 Drug Screening       Test Value Date Time    Urine Drug Screen       Amphetamine Screen  Negative  23 0943    Barbiturate Screen  Negative  23 0943    Benzodiazepine Screen  Negative  23 0943    Methadone Screen  Negative  23 0943    Phencyclidine Screen  Negative  23 0943    Opiates Screen  Negative  23 0943    THC Screen  Negative  23 0943    Cocaine Screen       Propoxyphene Screen       Buprenorphine Screen  Negative  23 0943    Methamphetamine Screen       Oxycodone Screen  Negative  23 0943    Tricyclic Antidepressants Screen  Negative  23 0943              Legend    ^: Historical                             Past OB History:     OB History    Para Term  AB Living   1 1 1 0 0 1   SAB IAB Ectopic Molar Multiple Live Births   0 0 0 0 0 1      # Outcome Date GA Lbr Bronson/2nd Weight Sex Type Anes PTL Lv   1 Term 24 38w3d 28:50 / 01:59 3430 g (7 lb 9 oz) M Vag-Spont EPI N SONIYA      Name: Lorenzo Li      Apgar1: 7  Apgar5: 9      Obstetric Comments   Preg #1 (IVF)       Past Medical History: Past Medical History:   Diagnosis Date    Asthma     CTS (carpal tunnel syndrome) 2024    Pregnancy  related    Polycystic ovary syndrome     Subluxation of patella 11/2001    Tear of meniscus of knee 11/2001      Past Surgical History Past Surgical History:   Procedure Laterality Date    EAR TUBES      KNEE SURGERY  2001    left mcl      Family History: Family History   Problem Relation Age of Onset    Diabetes Mother     Osteoporosis Mother     Diabetes Maternal Aunt     Diabetes Maternal Uncle       Social History:  reports that she has never smoked. She has never used smokeless tobacco.   reports that she does not currently use alcohol.   reports no history of drug use.        General ROS: Pertinent items are noted in HPI, all other systems reviewed and negative    Objective     Vital Signs Range for the last 24 hours  Temperature: Temp:  [98 °F (36.7 °C)] 98 °F (36.7 °C)   Temp Source: Temp src: Oral   BP: BP: (126-154)/(78-97) 131/83   Pulse: Heart Rate:  [82-95] 91   Respirations: Resp:  [18] 18   SPO2: SpO2:  [96 %-98 %] 96 %   O2 Amount (l/min):     O2 Devices     Weight:         Physical Examination:   GEN: alert and oriented, no acute distress  HEENT: atraumatic, normocephalic  CV: well-perfused  PULM: no increased work of breathing  ABD: gravid, nontender  MSK: minimal peripheral edema  NEURO: cranial nerves II - XII grossly intact  PSYCH: mood and affect appropriate     SVE: n/a  SSE: n/a    Labs: CBC / PreE panel unremarkable    Imaging: n/a        Assessment / Plan:     R/o PP PreE  Known GHTN  - Unable to distinguish between PreE and GHTN definitively given lack of proteinuria assessment. This was intentional due to desire to avoid ureteral catheterization during the postpartum period  - HA improved with Toradol  - Not breastfeeding  Plan:  - Non-severe hypertensive disorder of pregnancy, suspect pre-eclampsia; would advise ASA ppx starting at 12-16wks PP in future pregnancies  - Discharge home  - Daily BP checks, log  - Labetalol PO 200mg BID  - Advised on return precautions        Dispo: Discharge  home. Advised to make 2wk appt for BP review.    Milly Tomas MD  6/4/2024  23:34 EDT